# Patient Record
Sex: MALE | Race: WHITE | NOT HISPANIC OR LATINO | Employment: FULL TIME | URBAN - METROPOLITAN AREA
[De-identification: names, ages, dates, MRNs, and addresses within clinical notes are randomized per-mention and may not be internally consistent; named-entity substitution may affect disease eponyms.]

---

## 2017-01-31 ENCOUNTER — AMBULATORY - HEALTHEAST (OUTPATIENT)
Dept: LAB | Facility: CLINIC | Age: 37
End: 2017-01-31

## 2017-01-31 DIAGNOSIS — M06.9 RHEUMATOID ARTHRITIS (H): ICD-10-CM

## 2017-02-01 LAB
ALT SERPL W P-5'-P-CCNC: 22 U/L (ref 0–45)
CREAT SERPL-MCNC: 0.82 MG/DL (ref 0.7–1.3)
GFR SERPL CREATININE-BSD FRML MDRD: >60 ML/MIN/1.73M2

## 2017-02-10 ENCOUNTER — COMMUNICATION - HEALTHEAST (OUTPATIENT)
Dept: ADMINISTRATIVE | Facility: CLINIC | Age: 37
End: 2017-02-10

## 2017-02-10 DIAGNOSIS — M05.79 SEROPOSITIVE RHEUMATOID ARTHRITIS OF MULTIPLE SITES (H): ICD-10-CM

## 2017-03-29 ENCOUNTER — OFFICE VISIT - HEALTHEAST (OUTPATIENT)
Dept: RHEUMATOLOGY | Facility: CLINIC | Age: 37
End: 2017-03-29

## 2017-03-29 DIAGNOSIS — M25.461 KNEE EFFUSION, RIGHT: ICD-10-CM

## 2017-03-29 DIAGNOSIS — M05.79 SEROPOSITIVE RHEUMATOID ARTHRITIS OF MULTIPLE SITES (H): ICD-10-CM

## 2017-03-29 DIAGNOSIS — M06.9 RHEUMATOID ARTHRITIS (H): ICD-10-CM

## 2017-03-29 DIAGNOSIS — R76.8 CYCLIC CITRULLINATED PEPTIDE (CCP) ANTIBODY POSITIVE: ICD-10-CM

## 2017-03-29 DIAGNOSIS — M25.50 POLYARTHRALGIA: ICD-10-CM

## 2017-03-29 DIAGNOSIS — Z79.899 HIGH RISK MEDICATION USE: ICD-10-CM

## 2017-03-29 LAB
ALT SERPL W P-5'-P-CCNC: 21 U/L (ref 0–45)
CREAT SERPL-MCNC: 0.81 MG/DL (ref 0.7–1.3)
GFR SERPL CREATININE-BSD FRML MDRD: >60 ML/MIN/1.73M2

## 2017-03-29 ASSESSMENT — MIFFLIN-ST. JEOR: SCORE: 1558.54

## 2017-04-16 ENCOUNTER — COMMUNICATION - HEALTHEAST (OUTPATIENT)
Dept: RHEUMATOLOGY | Facility: CLINIC | Age: 37
End: 2017-04-16

## 2017-04-16 DIAGNOSIS — M05.79 SEROPOSITIVE RHEUMATOID ARTHRITIS OF MULTIPLE SITES (H): ICD-10-CM

## 2017-04-17 ENCOUNTER — COMMUNICATION - HEALTHEAST (OUTPATIENT)
Dept: ADMINISTRATIVE | Facility: CLINIC | Age: 37
End: 2017-04-17

## 2017-06-09 ENCOUNTER — COMMUNICATION - HEALTHEAST (OUTPATIENT)
Dept: RHEUMATOLOGY | Facility: CLINIC | Age: 37
End: 2017-06-09

## 2017-06-09 DIAGNOSIS — M05.79 SEROPOSITIVE RHEUMATOID ARTHRITIS OF MULTIPLE SITES (H): ICD-10-CM

## 2017-06-19 ENCOUNTER — COMMUNICATION - HEALTHEAST (OUTPATIENT)
Dept: RHEUMATOLOGY | Facility: CLINIC | Age: 37
End: 2017-06-19

## 2017-06-19 DIAGNOSIS — M05.79 SEROPOSITIVE RHEUMATOID ARTHRITIS OF MULTIPLE SITES (H): ICD-10-CM

## 2017-06-26 ENCOUNTER — AMBULATORY - HEALTHEAST (OUTPATIENT)
Dept: LAB | Facility: CLINIC | Age: 37
End: 2017-06-26

## 2017-06-26 DIAGNOSIS — M06.9 RHEUMATOID ARTHRITIS (H): ICD-10-CM

## 2017-06-26 LAB
ALT SERPL W P-5'-P-CCNC: 22 U/L (ref 0–45)
CREAT SERPL-MCNC: 0.87 MG/DL (ref 0.7–1.3)
GFR SERPL CREATININE-BSD FRML MDRD: >60 ML/MIN/1.73M2

## 2017-06-28 ENCOUNTER — OFFICE VISIT - HEALTHEAST (OUTPATIENT)
Dept: RHEUMATOLOGY | Facility: CLINIC | Age: 37
End: 2017-06-28

## 2017-06-28 ENCOUNTER — COMMUNICATION - HEALTHEAST (OUTPATIENT)
Dept: ADMINISTRATIVE | Facility: CLINIC | Age: 37
End: 2017-06-28

## 2017-06-28 DIAGNOSIS — R76.8 CYCLIC CITRULLINATED PEPTIDE (CCP) ANTIBODY POSITIVE: ICD-10-CM

## 2017-06-28 DIAGNOSIS — M05.79 SEROPOSITIVE RHEUMATOID ARTHRITIS OF MULTIPLE SITES (H): ICD-10-CM

## 2017-06-28 DIAGNOSIS — Z79.899 HIGH RISK MEDICATION USE: ICD-10-CM

## 2017-06-28 DIAGNOSIS — M71.21 POPLITEAL CYST, RIGHT: ICD-10-CM

## 2017-06-28 DIAGNOSIS — Z85.47 H/O TESTICULAR CANCER: ICD-10-CM

## 2017-06-28 ASSESSMENT — MIFFLIN-ST. JEOR: SCORE: 1558.54

## 2017-07-06 ENCOUNTER — COMMUNICATION - HEALTHEAST (OUTPATIENT)
Dept: RHEUMATOLOGY | Facility: CLINIC | Age: 37
End: 2017-07-06

## 2017-07-06 DIAGNOSIS — M05.79 SEROPOSITIVE RHEUMATOID ARTHRITIS OF MULTIPLE SITES (H): ICD-10-CM

## 2017-10-31 ENCOUNTER — COMMUNICATION - HEALTHEAST (OUTPATIENT)
Dept: ENDOCRINOLOGY | Facility: CLINIC | Age: 37
End: 2017-10-31

## 2017-10-31 DIAGNOSIS — M05.79 SEROPOSITIVE RHEUMATOID ARTHRITIS OF MULTIPLE SITES (H): ICD-10-CM

## 2017-11-01 ENCOUNTER — COMMUNICATION - HEALTHEAST (OUTPATIENT)
Dept: LAB | Facility: CLINIC | Age: 37
End: 2017-11-01

## 2017-11-01 DIAGNOSIS — M05.79 SEROPOSITIVE RHEUMATOID ARTHRITIS OF MULTIPLE SITES (H): ICD-10-CM

## 2017-11-02 ENCOUNTER — AMBULATORY - HEALTHEAST (OUTPATIENT)
Dept: LAB | Facility: CLINIC | Age: 37
End: 2017-11-02

## 2017-11-02 DIAGNOSIS — M05.79 SEROPOSITIVE RHEUMATOID ARTHRITIS OF MULTIPLE SITES (H): ICD-10-CM

## 2017-11-02 LAB
ALT SERPL W P-5'-P-CCNC: 16 U/L (ref 0–45)
CREAT SERPL-MCNC: 0.94 MG/DL (ref 0.7–1.3)
GFR SERPL CREATININE-BSD FRML MDRD: >60 ML/MIN/1.73M2

## 2018-01-18 ENCOUNTER — OFFICE VISIT - HEALTHEAST (OUTPATIENT)
Dept: RHEUMATOLOGY | Facility: CLINIC | Age: 38
End: 2018-01-18

## 2018-01-18 DIAGNOSIS — R76.8 CYCLIC CITRULLINATED PEPTIDE (CCP) ANTIBODY POSITIVE: ICD-10-CM

## 2018-01-18 DIAGNOSIS — Z79.899 HIGH RISK MEDICATION USE: ICD-10-CM

## 2018-01-18 DIAGNOSIS — M05.79 SEROPOSITIVE RHEUMATOID ARTHRITIS OF MULTIPLE SITES (H): ICD-10-CM

## 2018-01-18 ASSESSMENT — MIFFLIN-ST. JEOR: SCORE: 1560.36

## 2018-04-18 ENCOUNTER — COMMUNICATION - HEALTHEAST (OUTPATIENT)
Dept: ADMINISTRATIVE | Facility: CLINIC | Age: 38
End: 2018-04-18

## 2018-04-18 DIAGNOSIS — M05.79 SEROPOSITIVE RHEUMATOID ARTHRITIS OF MULTIPLE SITES (H): ICD-10-CM

## 2018-04-19 ENCOUNTER — AMBULATORY - HEALTHEAST (OUTPATIENT)
Dept: LAB | Facility: CLINIC | Age: 38
End: 2018-04-19

## 2018-04-19 DIAGNOSIS — M05.79 SEROPOSITIVE RHEUMATOID ARTHRITIS OF MULTIPLE SITES (H): ICD-10-CM

## 2018-04-19 LAB
ALBUMIN SERPL-MCNC: 3.7 G/DL (ref 3.5–5)
ALT SERPL W P-5'-P-CCNC: 17 U/L (ref 0–45)
CREAT SERPL-MCNC: 0.81 MG/DL (ref 0.7–1.3)
ERYTHROCYTE [DISTWIDTH] IN BLOOD BY AUTOMATED COUNT: 11.1 % (ref 11–14.5)
GFR SERPL CREATININE-BSD FRML MDRD: >60 ML/MIN/1.73M2
HCT VFR BLD AUTO: 49.2 % (ref 40–54)
HGB BLD-MCNC: 16.8 G/DL (ref 14–18)
MCH RBC QN AUTO: 31.5 PG (ref 27–34)
MCHC RBC AUTO-ENTMCNC: 34.1 G/DL (ref 32–36)
MCV RBC AUTO: 92 FL (ref 80–100)
PLATELET # BLD AUTO: 330 THOU/UL (ref 140–440)
PMV BLD AUTO: 7.3 FL (ref 7–10)
RBC # BLD AUTO: 5.33 MILL/UL (ref 4.4–6.2)
WBC: 7 THOU/UL (ref 4–11)

## 2018-04-25 ENCOUNTER — COMMUNICATION - HEALTHEAST (OUTPATIENT)
Dept: ENDOCRINOLOGY | Facility: CLINIC | Age: 38
End: 2018-04-25

## 2018-04-25 DIAGNOSIS — M05.79 SEROPOSITIVE RHEUMATOID ARTHRITIS OF MULTIPLE SITES (H): ICD-10-CM

## 2018-05-02 ENCOUNTER — COMMUNICATION - HEALTHEAST (OUTPATIENT)
Dept: ADMINISTRATIVE | Facility: CLINIC | Age: 38
End: 2018-05-02

## 2018-05-02 DIAGNOSIS — M05.79 SEROPOSITIVE RHEUMATOID ARTHRITIS OF MULTIPLE SITES (H): ICD-10-CM

## 2018-09-28 ENCOUNTER — COMMUNICATION - HEALTHEAST (OUTPATIENT)
Dept: ADMINISTRATIVE | Facility: CLINIC | Age: 38
End: 2018-09-28

## 2018-09-28 DIAGNOSIS — M05.79 SEROPOSITIVE RHEUMATOID ARTHRITIS OF MULTIPLE SITES (H): ICD-10-CM

## 2018-10-08 ENCOUNTER — AMBULATORY - HEALTHEAST (OUTPATIENT)
Dept: LAB | Facility: CLINIC | Age: 38
End: 2018-10-08

## 2018-10-08 DIAGNOSIS — M05.79 SEROPOSITIVE RHEUMATOID ARTHRITIS OF MULTIPLE SITES (H): ICD-10-CM

## 2018-10-08 LAB
ALBUMIN SERPL-MCNC: 3.9 G/DL (ref 3.5–5)
ALT SERPL W P-5'-P-CCNC: 18 U/L (ref 0–45)
CREAT SERPL-MCNC: 0.98 MG/DL (ref 0.7–1.3)
ERYTHROCYTE [DISTWIDTH] IN BLOOD BY AUTOMATED COUNT: 10.8 % (ref 11–14.5)
GFR SERPL CREATININE-BSD FRML MDRD: >60 ML/MIN/1.73M2
HCT VFR BLD AUTO: 48 % (ref 40–54)
HGB BLD-MCNC: 16.8 G/DL (ref 14–18)
MCH RBC QN AUTO: 31.9 PG (ref 27–34)
MCHC RBC AUTO-ENTMCNC: 35 G/DL (ref 32–36)
MCV RBC AUTO: 91 FL (ref 80–100)
PLATELET # BLD AUTO: 257 THOU/UL (ref 140–440)
PMV BLD AUTO: 7.8 FL (ref 7–10)
RBC # BLD AUTO: 5.26 MILL/UL (ref 4.4–6.2)
WBC: 6.1 THOU/UL (ref 4–11)

## 2018-10-30 ENCOUNTER — COMMUNICATION - HEALTHEAST (OUTPATIENT)
Dept: ADMINISTRATIVE | Facility: CLINIC | Age: 38
End: 2018-10-30

## 2018-10-30 DIAGNOSIS — M05.79 SEROPOSITIVE RHEUMATOID ARTHRITIS OF MULTIPLE SITES (H): ICD-10-CM

## 2018-11-14 ENCOUNTER — OFFICE VISIT - HEALTHEAST (OUTPATIENT)
Dept: RHEUMATOLOGY | Facility: CLINIC | Age: 38
End: 2018-11-14

## 2018-11-14 DIAGNOSIS — R76.8 CYCLIC CITRULLINATED PEPTIDE (CCP) ANTIBODY POSITIVE: ICD-10-CM

## 2018-11-14 DIAGNOSIS — Z79.899 HIGH RISK MEDICATION USE: ICD-10-CM

## 2018-11-14 DIAGNOSIS — M05.79 SEROPOSITIVE RHEUMATOID ARTHRITIS OF MULTIPLE SITES (H): ICD-10-CM

## 2019-04-08 ENCOUNTER — COMMUNICATION - HEALTHEAST (OUTPATIENT)
Dept: RHEUMATOLOGY | Facility: CLINIC | Age: 39
End: 2019-04-08

## 2019-04-17 ENCOUNTER — COMMUNICATION - HEALTHEAST (OUTPATIENT)
Dept: RHEUMATOLOGY | Facility: CLINIC | Age: 39
End: 2019-04-17

## 2019-04-17 ENCOUNTER — AMBULATORY - HEALTHEAST (OUTPATIENT)
Dept: RHEUMATOLOGY | Facility: CLINIC | Age: 39
End: 2019-04-17

## 2019-04-17 DIAGNOSIS — M05.79 SEROPOSITIVE RHEUMATOID ARTHRITIS OF MULTIPLE SITES (H): ICD-10-CM

## 2019-05-24 ENCOUNTER — COMMUNICATION - HEALTHEAST (OUTPATIENT)
Dept: ADMINISTRATIVE | Facility: CLINIC | Age: 39
End: 2019-05-24

## 2019-07-16 ENCOUNTER — COMMUNICATION - HEALTHEAST (OUTPATIENT)
Dept: RHEUMATOLOGY | Facility: CLINIC | Age: 39
End: 2019-07-16

## 2019-07-16 DIAGNOSIS — M05.79 SEROPOSITIVE RHEUMATOID ARTHRITIS OF MULTIPLE SITES (H): ICD-10-CM

## 2019-07-18 ENCOUNTER — OFFICE VISIT - HEALTHEAST (OUTPATIENT)
Dept: RHEUMATOLOGY | Facility: CLINIC | Age: 39
End: 2019-07-18

## 2019-07-18 DIAGNOSIS — R76.8 CYCLIC CITRULLINATED PEPTIDE (CCP) ANTIBODY POSITIVE: ICD-10-CM

## 2019-07-18 DIAGNOSIS — Z85.47 H/O TESTICULAR CANCER: ICD-10-CM

## 2019-07-18 DIAGNOSIS — M05.79 SEROPOSITIVE RHEUMATOID ARTHRITIS OF MULTIPLE SITES (H): ICD-10-CM

## 2019-07-18 DIAGNOSIS — Z79.899 HIGH RISK MEDICATION USE: ICD-10-CM

## 2019-07-18 LAB
ALBUMIN SERPL-MCNC: 3.7 G/DL (ref 3.5–5)
ALT SERPL W P-5'-P-CCNC: 17 U/L (ref 0–45)
C REACTIVE PROTEIN LHE: 0.5 MG/DL (ref 0–0.8)
CREAT SERPL-MCNC: 0.87 MG/DL (ref 0.7–1.3)
ERYTHROCYTE [DISTWIDTH] IN BLOOD BY AUTOMATED COUNT: 10.6 % (ref 11–14.5)
ERYTHROCYTE [SEDIMENTATION RATE] IN BLOOD BY WESTERGREN METHOD: 30 MM/HR (ref 0–15)
GFR SERPL CREATININE-BSD FRML MDRD: >60 ML/MIN/1.73M2
HCT VFR BLD AUTO: 47.6 % (ref 40–54)
HGB BLD-MCNC: 16.2 G/DL (ref 14–18)
MCH RBC QN AUTO: 30.8 PG (ref 27–34)
MCHC RBC AUTO-ENTMCNC: 34.1 G/DL (ref 32–36)
MCV RBC AUTO: 90 FL (ref 80–100)
PLATELET # BLD AUTO: 282 THOU/UL (ref 140–440)
PMV BLD AUTO: 7.6 FL (ref 7–10)
RBC # BLD AUTO: 5.28 MILL/UL (ref 4.4–6.2)
WBC: 6.1 THOU/UL (ref 4–11)

## 2019-08-02 ENCOUNTER — OFFICE VISIT - HEALTHEAST (OUTPATIENT)
Dept: RHEUMATOLOGY | Facility: CLINIC | Age: 39
End: 2019-08-02

## 2019-08-02 DIAGNOSIS — M05.79 SEROPOSITIVE RHEUMATOID ARTHRITIS OF MULTIPLE SITES (H): ICD-10-CM

## 2019-08-02 DIAGNOSIS — Z85.47 H/O TESTICULAR CANCER: ICD-10-CM

## 2019-08-02 DIAGNOSIS — R76.8 CYCLIC CITRULLINATED PEPTIDE (CCP) ANTIBODY POSITIVE: ICD-10-CM

## 2019-08-02 DIAGNOSIS — Z79.899 HIGH RISK MEDICATION USE: ICD-10-CM

## 2019-08-30 ENCOUNTER — AMBULATORY - HEALTHEAST (OUTPATIENT)
Dept: LAB | Facility: CLINIC | Age: 39
End: 2019-08-30

## 2019-08-30 DIAGNOSIS — M05.79 SEROPOSITIVE RHEUMATOID ARTHRITIS OF MULTIPLE SITES (H): ICD-10-CM

## 2019-08-30 LAB
ALBUMIN SERPL-MCNC: 4 G/DL (ref 3.5–5)
ALT SERPL W P-5'-P-CCNC: 23 U/L (ref 0–45)
CREAT SERPL-MCNC: 0.97 MG/DL (ref 0.7–1.3)
ERYTHROCYTE [DISTWIDTH] IN BLOOD BY AUTOMATED COUNT: 11.1 % (ref 11–14.5)
GFR SERPL CREATININE-BSD FRML MDRD: >60 ML/MIN/1.73M2
HCT VFR BLD AUTO: 50.2 % (ref 40–54)
HGB BLD-MCNC: 17 G/DL (ref 14–18)
MCH RBC QN AUTO: 30.9 PG (ref 27–34)
MCHC RBC AUTO-ENTMCNC: 33.8 G/DL (ref 32–36)
MCV RBC AUTO: 92 FL (ref 80–100)
PLATELET # BLD AUTO: 245 THOU/UL (ref 140–440)
PMV BLD AUTO: 7.8 FL (ref 7–10)
RBC # BLD AUTO: 5.48 MILL/UL (ref 4.4–6.2)
WBC: 6.4 THOU/UL (ref 4–11)

## 2019-10-21 ENCOUNTER — COMMUNICATION - HEALTHEAST (OUTPATIENT)
Dept: RHEUMATOLOGY | Facility: CLINIC | Age: 39
End: 2019-10-21

## 2019-10-21 DIAGNOSIS — M05.79 SEROPOSITIVE RHEUMATOID ARTHRITIS OF MULTIPLE SITES (H): ICD-10-CM

## 2019-10-24 ENCOUNTER — COMMUNICATION - HEALTHEAST (OUTPATIENT)
Dept: RHEUMATOLOGY | Facility: CLINIC | Age: 39
End: 2019-10-24

## 2019-10-25 ENCOUNTER — AMBULATORY - HEALTHEAST (OUTPATIENT)
Dept: LAB | Facility: CLINIC | Age: 39
End: 2019-10-25

## 2019-10-25 DIAGNOSIS — M05.79 SEROPOSITIVE RHEUMATOID ARTHRITIS OF MULTIPLE SITES (H): ICD-10-CM

## 2019-10-25 LAB
ALBUMIN SERPL-MCNC: 3.9 G/DL (ref 3.5–5)
ALT SERPL W P-5'-P-CCNC: 21 U/L (ref 0–45)
CREAT SERPL-MCNC: 0.89 MG/DL (ref 0.7–1.3)
ERYTHROCYTE [DISTWIDTH] IN BLOOD BY AUTOMATED COUNT: 10.9 % (ref 11–14.5)
GFR SERPL CREATININE-BSD FRML MDRD: >60 ML/MIN/1.73M2
HCT VFR BLD AUTO: 47.7 % (ref 40–54)
HGB BLD-MCNC: 16.5 G/DL (ref 14–18)
MCH RBC QN AUTO: 31.6 PG (ref 27–34)
MCHC RBC AUTO-ENTMCNC: 34.6 G/DL (ref 32–36)
MCV RBC AUTO: 91 FL (ref 80–100)
PLATELET # BLD AUTO: 242 THOU/UL (ref 140–440)
PMV BLD AUTO: 7.8 FL (ref 7–10)
RBC # BLD AUTO: 5.22 MILL/UL (ref 4.4–6.2)
WBC: 5.5 THOU/UL (ref 4–11)

## 2020-03-10 ENCOUNTER — COMMUNICATION - HEALTHEAST (OUTPATIENT)
Dept: RHEUMATOLOGY | Facility: CLINIC | Age: 40
End: 2020-03-10

## 2020-03-10 DIAGNOSIS — M05.79 SEROPOSITIVE RHEUMATOID ARTHRITIS OF MULTIPLE SITES (H): ICD-10-CM

## 2020-03-23 ENCOUNTER — COMMUNICATION - HEALTHEAST (OUTPATIENT)
Dept: LAB | Facility: CLINIC | Age: 40
End: 2020-03-23

## 2020-03-23 DIAGNOSIS — M05.79 SEROPOSITIVE RHEUMATOID ARTHRITIS OF MULTIPLE SITES (H): ICD-10-CM

## 2020-03-30 ENCOUNTER — COMMUNICATION - HEALTHEAST (OUTPATIENT)
Dept: RHEUMATOLOGY | Facility: CLINIC | Age: 40
End: 2020-03-30

## 2020-03-30 ENCOUNTER — OFFICE VISIT - HEALTHEAST (OUTPATIENT)
Dept: RHEUMATOLOGY | Facility: CLINIC | Age: 40
End: 2020-03-30

## 2020-03-30 DIAGNOSIS — Z79.899 HIGH RISK MEDICATION USE: ICD-10-CM

## 2020-03-30 DIAGNOSIS — M05.79 SEROPOSITIVE RHEUMATOID ARTHRITIS OF MULTIPLE SITES (H): ICD-10-CM

## 2020-03-30 DIAGNOSIS — R76.8 CYCLIC CITRULLINATED PEPTIDE (CCP) ANTIBODY POSITIVE: ICD-10-CM

## 2020-04-15 ENCOUNTER — COMMUNICATION - HEALTHEAST (OUTPATIENT)
Dept: ADMINISTRATIVE | Facility: CLINIC | Age: 40
End: 2020-04-15

## 2020-07-30 ENCOUNTER — COMMUNICATION - HEALTHEAST (OUTPATIENT)
Dept: RHEUMATOLOGY | Facility: CLINIC | Age: 40
End: 2020-07-30

## 2020-11-03 ENCOUNTER — COMMUNICATION - HEALTHEAST (OUTPATIENT)
Dept: RHEUMATOLOGY | Facility: CLINIC | Age: 40
End: 2020-11-03

## 2020-11-03 ENCOUNTER — AMBULATORY - HEALTHEAST (OUTPATIENT)
Dept: LAB | Facility: CLINIC | Age: 40
End: 2020-11-03

## 2020-11-03 DIAGNOSIS — M05.79 SEROPOSITIVE RHEUMATOID ARTHRITIS OF MULTIPLE SITES (H): ICD-10-CM

## 2020-11-03 DIAGNOSIS — Z79.899 HIGH RISK MEDICATION USE: ICD-10-CM

## 2020-11-03 LAB
ALBUMIN SERPL-MCNC: 3.8 G/DL (ref 3.5–5)
ALT SERPL W P-5'-P-CCNC: 19 U/L (ref 0–45)
CREAT SERPL-MCNC: 0.9 MG/DL (ref 0.7–1.3)
ERYTHROCYTE [DISTWIDTH] IN BLOOD BY AUTOMATED COUNT: 10.5 % (ref 11–14.5)
GFR SERPL CREATININE-BSD FRML MDRD: >60 ML/MIN/1.73M2
HCT VFR BLD AUTO: 45.7 % (ref 40–54)
HGB BLD-MCNC: 15.6 G/DL (ref 14–18)
MCH RBC QN AUTO: 31.1 PG (ref 27–34)
MCHC RBC AUTO-ENTMCNC: 34.1 G/DL (ref 32–36)
MCV RBC AUTO: 91 FL (ref 80–100)
PLATELET # BLD AUTO: 249 THOU/UL (ref 140–440)
PMV BLD AUTO: 8.8 FL (ref 7–10)
RBC # BLD AUTO: 5.01 MILL/UL (ref 4.4–6.2)
WBC: 4.4 THOU/UL (ref 4–11)

## 2020-11-06 ENCOUNTER — COMMUNICATION - HEALTHEAST (OUTPATIENT)
Dept: RHEUMATOLOGY | Facility: CLINIC | Age: 40
End: 2020-11-06

## 2020-12-07 ENCOUNTER — OFFICE VISIT - HEALTHEAST (OUTPATIENT)
Dept: RHEUMATOLOGY | Facility: CLINIC | Age: 40
End: 2020-12-07

## 2020-12-07 DIAGNOSIS — M70.21 OLECRANON BURSITIS OF BOTH ELBOWS: ICD-10-CM

## 2020-12-07 DIAGNOSIS — R76.8 CYCLIC CITRULLINATED PEPTIDE (CCP) ANTIBODY POSITIVE: ICD-10-CM

## 2020-12-07 DIAGNOSIS — Z85.47 H/O TESTICULAR CANCER: ICD-10-CM

## 2020-12-07 DIAGNOSIS — Z79.899 HIGH RISK MEDICATION USE: ICD-10-CM

## 2020-12-07 DIAGNOSIS — M05.79 SEROPOSITIVE RHEUMATOID ARTHRITIS OF MULTIPLE SITES (H): ICD-10-CM

## 2020-12-07 DIAGNOSIS — M70.22 OLECRANON BURSITIS OF BOTH ELBOWS: ICD-10-CM

## 2020-12-23 ENCOUNTER — OFFICE VISIT - HEALTHEAST (OUTPATIENT)
Dept: RHEUMATOLOGY | Facility: CLINIC | Age: 40
End: 2020-12-23

## 2020-12-23 DIAGNOSIS — M25.421 EFFUSION OF OLECRANON BURSA, RIGHT: ICD-10-CM

## 2020-12-23 DIAGNOSIS — M05.79 SEROPOSITIVE RHEUMATOID ARTHRITIS OF MULTIPLE SITES (H): ICD-10-CM

## 2021-03-25 ENCOUNTER — COMMUNICATION - HEALTHEAST (OUTPATIENT)
Dept: RHEUMATOLOGY | Facility: CLINIC | Age: 41
End: 2021-03-25

## 2021-03-25 DIAGNOSIS — M05.79 SEROPOSITIVE RHEUMATOID ARTHRITIS OF MULTIPLE SITES (H): ICD-10-CM

## 2021-05-27 NOTE — TELEPHONE ENCOUNTER
Request was thru patient's [employer] insurance.  That insurance termed 08/31/2018.  Patient now has [facility] insurance.  They have an active PA on file until 10/04/2019.  Called and verified with pharmacy and they stated they are not currently waiting on a PA and that patient has been able to fill is script without issue.

## 2021-05-30 VITALS — HEIGHT: 70 IN | WEIGHT: 141.6 LBS | BODY MASS INDEX: 20.27 KG/M2

## 2021-05-30 NOTE — PROGRESS NOTES
ASSESSMENT AND PLAN:  Robert Horton 38 y.o. male is a for follow-up after extended hiatus.  He has severe seropositive rheumatoid arthritis.  He has active synovitis in PIPs of both his hands.  This is despite Humira.  We had discussed how smoking can neutralize several of the treatment options and rheumatoid arthritis including tumor necrosis factor inhibitors.  Today's plan would be for him to reconsider these options.  Meanwhile prednisone 15 mg daily for the next 2 weeks and we will get together further course to be charted.  Today's labs as noted.         Diagnoses and all orders for this visit:    Seropositive rheumatoid arthritis of multiple sites (H)  -     predniSONE (DELTASONE) 10 mg tablet; Take 15 mg by mouth daily.  Dispense: 45 tablet; Refill: 0  -     C-Reactive Protein  -     Erythrocyte Sedimentation Rate    Cyclic citrullinated peptide (CCP) antibody positive  -     predniSONE (DELTASONE) 10 mg tablet; Take 15 mg by mouth daily.  Dispense: 45 tablet; Refill: 0    High risk medication use  -     ALT (SGPT)  -     Albumin  -     Creatinine  -     HM2(CBC w/o Differential)    H/O testicular cancer      HISTORY OF PRESENTING ILLNESS:  Robert Horton, 38 y.o., male is a  who has severe seropositive rheumatoid arthritis here for follow-up after extended hiatus.  He is on Humira.  He took it most recently 2 weeks ago, but the dose prior was missed because it leaked out, and had a 3-week break.  He is reporting increasing pain.  This is in his hands bilaterally.  Moderately severe.  He points to the PIPs of both hands.  He has in the past and not been inclined to take methotrexate in part because of his alcohol consumption and because of friends mom who had rheumatoid arthritis had passed away and the concern was it might have been secondary to methotrexate.  As noted before he does not think that the quitting smoking is right now an option for him..  Because of some insurance issues  "related with his and his wife, he was not able to get Humira for 2 months 2-1/2 months he started hurting.  He has been using ibuprofen.  This is mostly in the left wrist.  With the last couple of injections he is beginning to improve.  He noted his pain level down to 0.5/10 mild and now in the left wrist, able do all his day-to-day activities, no stiffness in the morning.  Does not want to take methotrexate.   .  He had testicular cancer 10 years ago and is been deemed cured.  He is a smoker for the past 15 years 20 cigarettes a day alcohol 12-18 drinks per week. Further historical information and ADL limitations as noted in the multidimensional health assessment questionnaire attached in the EMR.       Following is the excerpt from a recent note: .His arthropathy beganund summer of 2015.  In the initial phases he started hurting in his hands and knees and now he is hurting in those areas as well as the shoulders and wrists.  He was seen in his local clinic at fault was constant.  Evidently his labs showed that he had anti-CCP antibody of more than 250, above the labs in reference range.  He was seen by rheumatology and [facility].  He was asked to start taking methotrexate, Enbrel.  Prior to that he had been on prednisone which helped his symptoms most.  Currently he takes taking \"2000 mg\" of Tylenol or ibuprofen the same time.  He has noted that the pain is so severe that it takes him half an hour to put on one of his socks in the morning.  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  As he was billing his life up with his family he decided not to carry health insurance.  And this was in an effort to get at home for themselves.  He was successful in saving the money and buying the home.  He unfortunately developed his current symptoms with no insurance.  He reports pain level of severe nature, stiffness in the morning lasting 7-8 hours, there is hardly " anything that he can do without difficulty..  He has fatigue and weakness.  He has swelling on the knees especially the right side.  He feels that his knuckles are swollen.  He has difficult time sleeping at night because he is woken repeatedly due to the shoulder pain.  ALLERGIES:Gadolinium-containing contrast media and Oxygen    PAST MEDICAL/ACTIVE PROBLEMS/MEDICATION/ FAMILY HISTORY/SOCIAL DATA:  The patient has a family history of  No past medical history on file.  Social History     Tobacco Use   Smoking Status Current Every Day Smoker   Smokeless Tobacco Never Used   Tobacco Comment    20  ciggs/daily     Patient Active Problem List   Diagnosis     Seropositive rheumatoid arthritis of multiple sites (H)     Cyclic citrullinated peptide (CCP) antibody positive     High risk medication use     Popliteal cyst, right     H/O testicular cancer     Current Outpatient Medications   Medication Sig Dispense Refill     HUMIRA PEN 40 mg/0.8 mL PnKt Inject 0.8ML (40MG TOTAL) UNDER THE SKIN EVERY 14 DAYS 1 kit 2     ibuprofen (ADVIL,MOTRIN) 800 MG tablet Take 800 mg by mouth every 6 (six) hours as needed for pain.       folic acid (FOLVITE) 1 MG tablet Take 1 tablet (1 mg total) by mouth daily. 90 tablet 3     Current Facility-Administered Medications   Medication Dose Route Frequency Provider Last Rate Last Dose     triamcinolone acetonide 40 mg/mL injection 40 mg (KENALOG-40)  40 mg Intra-articular Once Mckay Maguire MBBS            DETAILED EXAMINATION  07/18/19  :  Vitals:    07/18/19 1245   BP: 108/58   Patient Site: Right Arm   Patient Position: Sitting   Cuff Size: Adult Regular   Pulse: 64   Weight: 143 lb (64.9 kg)     Alert oriented. Head including the face is examined for malar rash, heliotropes, scarring, lupus pernio. Eyes examined for redness such as in episcleritis/scleritis, periorbital lesions.   Neck/ Face examined for parotid gland swelling, range of motion of neck.  Left upper and lower and right upper  and lower extremities examined for tenderness, swelling, warmth of the appendicular joints, range of motion, edema, rash.  Some of the important findings included: He has synovitis of the right middle and ring finger PIP especially the ring, and of the left middle finger PIP joints.            LAB / IMAGING DATA:  ALT   Date Value Ref Range Status   10/08/2018 18 0 - 45 U/L Final   04/19/2018 17 0 - 45 U/L Final   11/02/2017 16 0 - 45 U/L Final       WBC   Date Value Ref Range Status   10/08/2018 6.1 4.0 - 11.0 thou/uL Final   04/19/2018 7.0 4.0 - 11.0 thou/uL Final   09/29/2015 5.7 4.0 - 11.0 thou/uL Final       No results found for: MISSY

## 2021-05-31 VITALS — HEIGHT: 70 IN | WEIGHT: 141.6 LBS | BODY MASS INDEX: 20.27 KG/M2

## 2021-05-31 VITALS — WEIGHT: 142 LBS | HEIGHT: 70 IN | BODY MASS INDEX: 20.33 KG/M2

## 2021-05-31 NOTE — PROGRESS NOTES
"ASSESSMENT AND PLAN:  Robert Horton 38 y.o. male is a for follow-up.  He has a severe seropositive very high titer of anti-CCP antibody more than 1200 rheumatoid arthritis, he has responded to Humira but my residual synovitis in his hands, as noted in the previous visit responded to corticosteroids, he drinks \"a lot of alcohol\" 5 days a week, hands reluctant to go for methotrexate, go for sulfasalazine major side effects outlined literature on this is provided.  Meanwhile taper prednisone as noted.  Will meet here in 3 months.  Labs every 4 weeks.            Diagnoses and all orders for this visit:    Seropositive rheumatoid arthritis of multiple sites (H)  -     sulfaSALAzine (AZULFIDINE) 500 mg tablet; Take 1 tablet (500 mg total) by mouth 2 (two) times a day for 15 days, THEN 2 tablets (1,000 mg total) 2 (two) times a day.  Dispense: 210 tablet; Refill: 0  -     predniSONE (DELTASONE) 2.5 MG tablet; 7.5 mg/d prednisone PO for 1 month, reduce to 5 mg/d for the next month, and then reduce to 2.5 mg/d for the third month and then stop..  Dispense: 90 tablet; Refill: 2    Cyclic citrullinated peptide (CCP) antibody positive    H/O testicular cancer    High risk medication use      HISTORY OF PRESENTING ILLNESS:  Robert Horton, 38 y.o., male is a  who has severe seropositive rheumatoid arthritis here for follow-up.  With the supplementary prednisone in addition to his back on Humira he feels 99% improved.  He noted pain level to be 0.5/10.  He has a tiny swelling on his left middle finger PIP area.  Other joint areas of being so much better.  His work is very stressful.  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  He would prefer not to.  He cannot have given his job description take other options such as marijuana.    Following is the excerpt from a recent note:     He " "is on Humira.  He took it most recently 2 weeks ago, but the dose prior was missed because it leaked out, and had a 3-week break.  He is reporting increasing pain.  This is in his hands bilaterally.  Moderately severe.  He points to the PIPs of both hands.  He has in the past and not been inclined to take methotrexate in part because of his alcohol consumption and because of friends mom who had rheumatoid arthritis had passed away and the concern was it might have been secondary to methotrexate.  As noted before he does not think that the quitting smoking is right now an option for him..  Because of some insurance issues related with his and his wife, he was not able to get Humira for 2 months 2-1/2 months he started hurting.  He has been using ibuprofen.  This is mostly in the left wrist.  With the last couple of injections he is beginning to improve.  He noted his pain level down to 0.5/10 mild and now in the left wrist, able do all his day-to-day activities, no stiffness in the morning.  Does not want to take methotrexate.   .  He had testicular cancer 10 years ago and is been deemed cured.  He is a smoker for the past 15 years 20 cigarettes a day alcohol 12-18 drinks per week. Further historical information and ADL limitations as noted in the multidimensional health assessment questionnaire attached in the EMR.       Following is the excerpt from a recent note: .His arthropathy beganund summer of 2015.  In the initial phases he started hurting in his hands and knees and now he is hurting in those areas as well as the shoulders and wrists.  He was seen in his local clinic at fault was constant.  Evidently his labs showed that he had anti-CCP antibody of more than 250, above the labs in reference range.  He was seen by rheumatology and [facility].  He was asked to start taking methotrexate, Enbrel.  Prior to that he had been on prednisone which helped his symptoms most.  Currently he takes taking \"2000 " "mg\" of Tylenol or ibuprofen the same time.  He has noted that the pain is so severe that it takes him half an hour to put on one of his socks in the morning.  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  As he was billing his life up with his family he decided not to carry health insurance.  And this was in an effort to get at home for themselves.  He was successful in saving the money and buying the home.  He unfortunately developed his current symptoms with no insurance.  He reports pain level of severe nature, stiffness in the morning lasting 7-8 hours, there is hardly anything that he can do without difficulty..  He has fatigue and weakness.  He has swelling on the knees especially the right side.  He feels that his knuckles are swollen.  He has difficult time sleeping at night because he is woken repeatedly due to the shoulder pain.  ALLERGIES:Gadolinium-containing contrast media and Oxygen    PAST MEDICAL/ACTIVE PROBLEMS/MEDICATION/ FAMILY HISTORY/SOCIAL DATA:  The patient has a family history of  No past medical history on file.  Social History     Tobacco Use   Smoking Status Current Every Day Smoker   Smokeless Tobacco Never Used   Tobacco Comment    20  ciggs/daily     Patient Active Problem List   Diagnosis     Seropositive rheumatoid arthritis of multiple sites (H)     Cyclic citrullinated peptide (CCP) antibody positive     High risk medication use     Popliteal cyst, right     H/O testicular cancer     Current Outpatient Medications   Medication Sig Dispense Refill     HUMIRA PEN 40 mg/0.8 mL PnKt Inject 0.8ML (40MG TOTAL) UNDER THE SKIN EVERY 14 DAYS 1 kit 2     ibuprofen (ADVIL,MOTRIN) 800 MG tablet Take 800 mg by mouth every 6 (six) hours as needed for pain.       predniSONE (DELTASONE) 10 mg tablet Take 15 mg by mouth daily. 45 tablet 0     folic acid (FOLVITE) 1 MG tablet Take 1 tablet (1 mg total) by mouth daily. 90 tablet 3     Current " Facility-Administered Medications   Medication Dose Route Frequency Provider Last Rate Last Dose     triamcinolone acetonide 40 mg/mL injection 40 mg (KENALOG-40)  40 mg Intra-articular Once Mckay Maguire MBBS            DETAILED EXAMINATION  08/02/19  :  Vitals:    08/02/19 1026   BP: 96/60   Patient Site: Right Arm   Patient Position: Sitting   Cuff Size: Adult Regular   Pulse: 66   Weight: 143 lb (64.9 kg)     Alert oriented. Head including the face is examined for malar rash, heliotropes, scarring, lupus pernio. Eyes examined for redness such as in episcleritis/scleritis, periorbital lesions.   Neck/ Face examined for parotid gland swelling, range of motion of neck.  Left upper and lower and right upper and lower extremities examined for tenderness, swelling, warmth of the appendicular joints, range of motion, edema, rash.  Some of the important findings included: There is no synovitis in any of his palpable upper extremity joints, knees, ankles, he has the residual left middle finger PIP tiny synovial cyst on the dorsal aspect for the ulnar side of the joint.               LAB / IMAGING DATA:  ALT   Date Value Ref Range Status   07/18/2019 17 0 - 45 U/L Final   10/08/2018 18 0 - 45 U/L Final   04/19/2018 17 0 - 45 U/L Final       WBC   Date Value Ref Range Status   07/18/2019 6.1 4.0 - 11.0 thou/uL Final   10/08/2018 6.1 4.0 - 11.0 thou/uL Final   09/29/2015 5.7 4.0 - 11.0 thou/uL Final       No results found for: MISSY

## 2021-06-02 VITALS — WEIGHT: 142 LBS | BODY MASS INDEX: 20.37 KG/M2

## 2021-06-02 NOTE — TELEPHONE ENCOUNTER
Patient is calling, he is waiting on the Humira prescription to get to his pharmacy. He is hoping we can get this Rx sent in asap.

## 2021-06-02 NOTE — TELEPHONE ENCOUNTER
Who is calling:  Patient     Reason for Call: Calling back for update on PA status. Patient is anxiously awaiting the approval of this medication . Please call and update .   Date of last appointment with     primary care: 08/02/19    Okay to leave a detailed message: Yes

## 2021-06-03 VITALS — WEIGHT: 143 LBS | BODY MASS INDEX: 20.52 KG/M2

## 2021-06-05 VITALS
SYSTOLIC BLOOD PRESSURE: 116 MMHG | BODY MASS INDEX: 21.52 KG/M2 | WEIGHT: 150 LBS | DIASTOLIC BLOOD PRESSURE: 80 MMHG | HEART RATE: 72 BPM

## 2021-06-07 NOTE — PROGRESS NOTES
"Robert Horton is a 39 y.o. male who is being evaluated via a billable telephone visit.      The patient has been notified of following:     \"This telephone visit will be conducted via a call between you and your physician/provider. We have found that certain health care needs can be provided without the need for a physical exam.  This service lets us provide the care you need with a short phone conversation.  If a prescription is necessary we can send it directly to your pharmacy.  If lab work is needed we can place an order for that and you can then stop by our lab to have the test done at a later time.    If during the course of the call the physician/provider feels a telephone visit is not appropriate, you will not be charged for this service.\"     Physician has received verbal consent for a Telephone Visit from the patient? Yes    Robert Horton complains of    Chief Complaint   Patient presents with     Follow-up       I have reviewed and updated the patient's Past Medical History, Social History, Family History and Medication List.    ALLERGIES  Gadolinium-containing contrast media and Oxygen    Additional provider notes:     Chely Newell CMA      ASSESSMENT AND PLAN:    Diagnoses and all orders for this visit:    Seropositive rheumatoid arthritis of multiple sites (H)  -     ALT (SGPT); Standing  -     Albumin; Standing  -     Creatinine; Standing  -     HM2(CBC w/o Differential); Standing  -     adalimumab 40 mg/0.8 mL PnKt; Inject 0.8 mLs (40 mg) subcutaneously every 14 days.  Dispense: 1 kit; Refill: 4    Cyclic citrullinated peptide (CCP) antibody positive    High risk medication use  -     ALT (SGPT); Standing  -     Albumin; Standing  -     Creatinine; Standing  -     HM2(CBC w/o Differential); Standing          HISTORY OF PRESENTING ILLNESS:  Robert Horton 39 y.o. is evaluated here via phone/tele health visit.  He has rheumatoid arthritis he is no longer taking self after he ran out he " "stopped taking it he takes \"too much alcohol\" to be able to take methotrexate, due for labs.  He works in construction site as a coordinator.  History of testicular cancer that bleomycin.  Today we also discussed the issues related to the current pandemic, the pros and cons of the current treatment plan, the CDC guidelines such as social distancing washing the hands covering the cough.  ALLERGIES:Gadolinium-containing contrast media and Oxygen    PAST MEDICAL/ACTIVE PROBLEMS/MEDICATION/SOCIAL DATA  No past medical history on file.  Social History     Tobacco Use   Smoking Status Current Every Day Smoker   Smokeless Tobacco Never Used   Tobacco Comment    20  ciggs/daily     Patient Active Problem List   Diagnosis     Seropositive rheumatoid arthritis of multiple sites (H)     Cyclic citrullinated peptide (CCP) antibody positive     High risk medication use     Popliteal cyst, right     H/O testicular cancer     Current Outpatient Medications   Medication Sig Dispense Refill     adalimumab 40 mg/0.8 mL PnKt Inject 0.8 mLs (40 mg) subcutaneously every 14 days. 1 kit 1     ibuprofen (ADVIL,MOTRIN) 800 MG tablet Take 800 mg by mouth every 6 (six) hours as needed for pain.       folic acid (FOLVITE) 1 MG tablet Take 1 tablet (1 mg total) by mouth daily. 90 tablet 3     predniSONE (DELTASONE) 2.5 MG tablet 7.5 mg/d prednisone PO for 1 month, reduce to 5 mg/d for the next month, and then reduce to 2.5 mg/d for the third month and then stop.. 90 tablet 2     sulfaSALAzine (AZULFIDINE) 500 mg tablet Take 1 tablet (500 mg total) by mouth 2 (two) times a day for 15 days, THEN 2 tablets (1,000 mg total) 2 (two) times a day. 210 tablet 0     Current Facility-Administered Medications   Medication Dose Route Frequency Provider Last Rate Last Dose     triamcinolone acetonide 40 mg/mL injection 40 mg (KENALOG-40)  40 mg Intra-articular Once Mckay Maguire MBBS             EXAMINATION: None, phone visit.      LAB / IMAGING DATA:  ALT "   Date Value Ref Range Status   10/25/2019 21 0 - 45 U/L Final   08/30/2019 23 0 - 45 U/L Final   07/18/2019 17 0 - 45 U/L Final     Albumin   Date Value Ref Range Status   10/25/2019 3.9 3.5 - 5.0 g/dL Final   08/30/2019 4.0 3.5 - 5.0 g/dL Final   07/18/2019 3.7 3.5 - 5.0 g/dL Final     Creatinine   Date Value Ref Range Status   10/25/2019 0.89 0.70 - 1.30 mg/dL Final   08/30/2019 0.97 0.70 - 1.30 mg/dL Final   07/18/2019 0.87 0.70 - 1.30 mg/dL Final       WBC   Date Value Ref Range Status   10/25/2019 5.5 4.0 - 11.0 thou/uL Final   08/30/2019 6.4 4.0 - 11.0 thou/uL Final   09/29/2015 5.7 4.0 - 11.0 thou/uL Final     Hemoglobin   Date Value Ref Range Status   10/25/2019 16.5 14.0 - 18.0 g/dL Final   08/30/2019 17.0 14.0 - 18.0 g/dL Final   07/18/2019 16.2 14.0 - 18.0 g/dL Final     Platelets   Date Value Ref Range Status   10/25/2019 242 140 - 440 thou/uL Final   08/30/2019 245 140 - 440 thou/uL Final   07/18/2019 282 140 - 440 thou/uL Final       Lab Results   Component Value Date    RF 30.9 (H) 01/19/2016    SEDRATE 30 (H) 07/18/2019     Duration of the call:6.06 Minutes

## 2021-06-07 NOTE — TELEPHONE ENCOUNTER
Dr Maguire    In regards of: adalimumab 40 mg/0.8 mL PnKt     costing him 500$, before he had signed up for some paperwork to qualify for it, but assuming it isnt up to date anymore.     pls call him back @ [phone]

## 2021-06-07 NOTE — TELEPHONE ENCOUNTER
Per Dr. Maguire, 3 months an OV f/up   Scheduled.     Chely Newell CMA [facility] 3/30/2020 3:12 PM

## 2021-06-07 NOTE — TELEPHONE ENCOUNTER
spoke with pharmacy staff to get a better idea of what is going on. Sounds like Humira copay card has been exhausted and they are working with their  on some save on program.

## 2021-06-09 NOTE — PROGRESS NOTES
ASSESSMENT AND PLAN:  Robert Horton 36 y.o. male seems to have turned the corner finally for his severe high titer rheumatoid arthritis, anti-CCP more than 1200, he took about 3 months before the Humira did become effective.  He is going to stop hydroxychloroquine he is to continue methotrexate and Humira.  We talked about injecting the right knee.  He also describes painful shoulders which suggestive of tendinopathy.  We will revisit this on his next visit.  He is aware that there are early signs of damage to his knee cartilage already.  Continue labs as now.        Diagnoses and all orders for this visit:    Seropositive rheumatoid arthritis of multiple sites    Knee effusion, right    High risk medication use    Cyclic citrullinated peptide (CCP) antibody positive    Polyarthralgia    HISTORY OF PRESENTING ILLNESS:  Robert Horton, 36 y.o., male is a  who has severe seropositive rheumatoid arthritis here for follow-up of severe rheumatoid arthritis.  He seems to finally have done very well with the addition of Humira took him almost 3 months improved.  He has noted fullness in the popliteal area.  There is not much of pain.  He has discomfort in the shoulder areas especially at night when he rolls over on one side or the other.  This can occasionally wake him up.  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  He feels that his hands are improved so much almost back to normal.  He noted pain level of 3.0/10.  His shoulders can be stiff for 3 hours in the morning.  His knuckles are no longer swollen. There is no family history of rheumatoid arthritis, lupus, psoriasis, ulcerative colitis, Crohn's disease.  He had testicular cancer 10 years ago and is been deemed cured.  He is a smoker for the past 15 years 20 cigarettes a day alcohol 12-18 drinks per week. Further historical information and ADL limitations as noted in the  "multidimensional health assessment questionnaire attached in the EMR.       Following is the excerpt from a recent note: .His arthropathy beganund summer of 2015.  In the initial phases he started hurting in his hands and knees and now he is hurting in those areas as well as the shoulders and wrists.  He was seen in his local clinic at fault was constant.  Evidently his labs showed that he had anti-CCP antibody of more than 250, above the labs in reference range.  He was seen by rheumatology and [facility].  He was asked to start taking methotrexate, Enbrel.  Prior to that he had been on prednisone which helped his symptoms most.  Currently he takes taking \"2000 mg\" of Tylenol or ibuprofen the same time.  He has noted that the pain is so severe that it takes him half an hour to put on one of his socks in the morning.  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  As he was billing his life up with his family he decided not to carry health insurance.  And this was in an effort to get at home for themselves.  He was successful in saving the money and buying the home.  He unfortunately developed his current symptoms with no insurance.  He reports pain level of severe nature, stiffness in the morning lasting 7-8 hours, there is hardly anything that he can do without difficulty..  He has fatigue and weakness.  He has swelling on the knees especially the right side.  He feels that his knuckles are swollen.  He has difficult time sleeping at night because he is woken repeatedly due to the shoulder pain.  ALLERGIES:Gadolinium-containing contrast media and Oxygen    PAST MEDICAL/ACTIVE PROBLEMS/MEDICATION/ FAMILY HISTORY/SOCIAL DATA:  The patient has a family history of  No past medical history on file.  History   Smoking Status     Current Every Day Smoker   Smokeless Tobacco     Not on file     Comment: 20  ciggs/daily     Patient Active Problem List   Diagnosis     " "Knee effusion, right     Polyarthralgia     Seropositive rheumatoid arthritis of multiple sites     Cyclic citrullinated peptide (CCP) antibody positive     High risk medication use     Current Outpatient Prescriptions   Medication Sig Dispense Refill     acetaminophen (TYLENOL) 325 MG tablet Take 650 mg by mouth every 6 (six) hours as needed for pain.       adalimumab (HUMIRA) 40 mg/0.8 mL injection Inject 40 mg under the skin every 14 (fourteen) days.       HUMIRA PEN 40 mg/0.8 mL PnKt   10     hydroxychloroquine (PLAQUENIL) 200 mg tablet Take 1 tablet (200 mg total) by mouth 2 (two) times a day. 60 tablet 3     ibuprofen (ADVIL,MOTRIN) 800 MG tablet Take 800 mg by mouth every 6 (six) hours as needed for pain.       methotrexate 2.5 MG tablet TAKE TEN TABLETS BY MOUTH EVERY WEEK 40 tablet 1     predniSONE (DELTASONE) 2.5 MG tablet Take 7.5 mg/d prednisone PO for 1 month, reduce to 5 mg/d for the next month, and then reduce to 2.5 mg/d for the third month and then stop. 90 tablet 2     Current Facility-Administered Medications   Medication Dose Route Frequency Provider Last Rate Last Dose     triamcinolone acetonide 40 mg/mL injection 40 mg (KENALOG-40)  40 mg Intra-articular Once JOSEF Caldwell            DETAILED EXAMINATION (six area) :  Vitals:    03/29/17 1103   Weight: 141 lb 9.6 oz (64.2 kg)   Height: 5' 10\" (1.778 m)     Alert oriented. Head including the face is examined for malar rash, heliotropes, scarring, lupus pernio. Eyes examined for redness such as in episcleritis/scleritis, periorbital lesions.   Neck examined  for lymph nodes, range of motion Both upper and lower extremities (all four) examined for swollen, warm &/or  tender joints, range of motion, rash, muscle weakness, edema. The salient normal / abnormal findings are appended.    In obvious pain alert oriented male. Vital data as noted above.  He has persistent swelling in the popliteal area on the right knee with an effusion.  However he " does not have any significant residual synovitis anymore.  He appears very more comfortable.      LAB / IMAGING DATA:  ALT   Date Value Ref Range Status   01/31/2017 22 0 - 45 U/L Final   11/11/2016 19 0 - 45 U/L Final   08/09/2016 18 0 - 45 U/L Final       WBC   Date Value Ref Range Status   01/31/2017 7.5 4.0 - 11.0 thou/uL Final   11/11/2016 10.6 4.0 - 11.0 thou/uL Final   09/29/2015 5.7 4.0 - 11.0 thou/uL Final       No results found for: MISSY

## 2021-06-10 NOTE — TELEPHONE ENCOUNTER
LVMTCB    Because of the COVID-19 restrictions, we're not seeing pt in clinic at this time.   1st call was made to confirm appt today- no answer. Will try again later    Chely Newell CMA [facility] 7/30/2020 10:57 AM

## 2021-06-10 NOTE — TELEPHONE ENCOUNTER
LVMTCB  2nd attempt,  no answer, unable to confirm appt.  appt today w Dr. Maguire is canceled.      Chely Newell CMA [facility] 7/30/2020 11:58 AM

## 2021-06-11 NOTE — PROGRESS NOTES
ASSESSMENT AND PLAN:  Robert Horton 36 y.o. male severe seropositive rheumatoid arthritis is finally doing great with a combination of Humira and methotrexate.  He has residual popliteal cyst on the right side.  His marital situation has taken an adverse turn and they may be parting ways, he is insured through her.  We reviewed the data labs so far.  I have asked him to now stress the labs to every 3 months.  We will meet here at 12 weeks.        Diagnoses and all orders for this visit:    Seropositive rheumatoid arthritis of multiple sites  -     Discontinue: methotrexate 2.5 MG tablet; TAKE 10 TABLETS BY MOUTH ONCE WEEKLY AS DIRECTED  Dispense: 40 tablet; Refill: 1  -     methotrexate 2.5 MG tablet; TAKE 10 TABLETS BY MOUTH ONCE WEEKLY AS DIRECTED  Dispense: 120 tablet; Refill: 0  -     folic acid (FOLVITE) 1 MG tablet; Take 1 tablet (1 mg total) by mouth daily.  Dispense: 90 tablet; Refill: 3    Cyclic citrullinated peptide (CCP) antibody positive    High risk medication use  -     folic acid (FOLVITE) 1 MG tablet; Take 1 tablet (1 mg total) by mouth daily.  Dispense: 90 tablet; Refill: 3    Popliteal cyst, right    H/O testicular cancer      HISTORY OF PRESENTING ILLNESS:  Robert Horton, 36 y.o., male is a  who has severe seropositive rheumatoid arthritis here for follow-up of severe rheumatoid arthritis.  He seems to finally have done very well with the addition of Humira.  He is very happy with the progress.  He is virtually pain-free now.  He still has some discomfort in his right knee when he ambulates for long distance with a feeling of pressure popliteal area.  His concern is now that his wife and he are possibly putting the airways and she is stopping working and he is concerned that he might lose insurance which is through her.  He is going to look into this and other options.   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  He feels that his hands are improved so much almost back to normal.  He noted pain level of 3.0/10.  His shoulders can be stiff for 3 hours in the morning.  His knuckles are no longer swollen. There is no family history of rheumatoid arthritis, lupus, psoriasis, ulcerative colitis, Crohn's disease.  He had testicular cancer 10 years ago and is been deemed cured.  He is a smoker for the past 15 years 20 cigarettes a day alcohol 12-18 drinks per week. Further historical information and ADL limitations as noted in the multidimensional health assessment questionnaire attached in the EMR.       Following is the excerpt from a recent note: .His arthropathy beganund summer of 2015.  In the initial phases he started hurting in his hands and knees and now he is hurting in those areas as well as the shoulders and wrists.  He was seen in his local clinic at fault was constant.  Evidently his labs showed that he had anti-CCP antibody of more than 250, above the labs in reference range.  He was seen by rheumatology and [facility].  He was asked to start taking methotrexate, Enbrel.  Prior to that he had been on prednisone which helped his symptoms most.  Currently he takes taking \"2000 mg\" of Tylenol or ibuprofen the same time.  He has noted that the pain is so severe that it takes him half an hour to put on one of his socks in the morning.  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  As he was billing his life up with his family he decided not to carry health insurance.  And this was in an effort to get at home for themselves.  He was successful in saving the money and buying the home.  He unfortunately developed his current symptoms with no insurance.  He reports pain level of severe nature, stiffness in the morning lasting 7-8 hours, there is hardly anything that he can do without difficulty..  He has fatigue and weakness.  He has swelling on the knees " "especially the right side.  He feels that his knuckles are swollen.  He has difficult time sleeping at night because he is woken repeatedly due to the shoulder pain.  ALLERGIES:Gadolinium-containing contrast media and Oxygen    PAST MEDICAL/ACTIVE PROBLEMS/MEDICATION/ FAMILY HISTORY/SOCIAL DATA:  The patient has a family history of  No past medical history on file.  History   Smoking Status     Current Every Day Smoker   Smokeless Tobacco     Not on file     Comment: 20  ciggs/daily     Patient Active Problem List   Diagnosis     Seropositive rheumatoid arthritis of multiple sites     Cyclic citrullinated peptide (CCP) antibody positive     High risk medication use     Popliteal cyst, right     Current Outpatient Prescriptions   Medication Sig Dispense Refill     acetaminophen (TYLENOL) 325 MG tablet Take 650 mg by mouth every 6 (six) hours as needed for pain.       adalimumab (HUMIRA) 40 mg/0.8 mL injection Inject 0.8 mL (40 mg total) under the skin every 14 (fourteen) days. 2 each 11     HUMIRA PEN 40 mg/0.8 mL PnKt   10     ibuprofen (ADVIL,MOTRIN) 800 MG tablet Take 800 mg by mouth every 6 (six) hours as needed for pain.       methotrexate 2.5 MG tablet TAKE 10 TABLETS BY MOUTH ONCE WEEKLY AS DIRECTED 40 tablet 1     Current Facility-Administered Medications   Medication Dose Route Frequency Provider Last Rate Last Dose     triamcinolone acetonide 40 mg/mL injection 40 mg (KENALOG-40)  40 mg Intra-articular Once JOSEF Caldwell            DETAILED EXAMINATION (six area) :  Vitals:    06/28/17 1020   BP: 106/56   Patient Site: Left Arm   Patient Position: Sitting   Cuff Size: Adult Regular   Pulse: 80   Weight: 141 lb 9.6 oz (64.2 kg)   Height: 5' 10\" (1.778 m)     Alert oriented. Head including the face is examined for malar rash, heliotropes, scarring, lupus pernio. Eyes examined for redness such as in episcleritis/scleritis, periorbital lesions.   Neck examined  for lymph nodes, range of motion Both upper and " lower extremities (all four) examined for swollen, warm &/or  tender joints, range of motion, rash, muscle weakness, edema. The salient normal / abnormal findings are appended.  Vital data as noted above.  He has persistent swelling in the popliteal area on the right knee. However he does not have any residual synovitis anymore.  He appears very more comfortable.      LAB / IMAGING DATA:  ALT   Date Value Ref Range Status   06/26/2017 22 0 - 45 U/L Final   03/29/2017 21 0 - 45 U/L Final   01/31/2017 22 0 - 45 U/L Final       WBC   Date Value Ref Range Status   06/26/2017 6.6 4.0 - 11.0 thou/uL Final   03/29/2017 7.7 4.0 - 11.0 thou/uL Final   09/29/2015 5.7 4.0 - 11.0 thou/uL Final       No results found for: MISSY

## 2021-06-13 NOTE — PROGRESS NOTES
"Robert Horton is a 40 y.o. male who is being evaluated via a billable video visit.      The patient has been notified of following:     \"This video visit will be conducted via a call between you and your physician/provider. We have found that certain health care needs can be provided without the need for an in-person physical exam.  This service lets us provide the care you need with a video conversation.  If a prescription is necessary we can send it directly to your pharmacy.  If lab work is needed we can place an order for that and you can then stop by our lab to have the test done at a later time.    Video visits are billed at different rates depending on your insurance coverage. Please reach out to your insurance provider with any questions.    If during the course of the call the physician/provider feels a video visit is not appropriate, you will not be charged for this service.\"    Patient has given verbal consent to a Video visit? Yes  How would you like to obtain your AVS? AVS Preference: MyChart.  If dropped by the video visit, the video invitation should be sent to: Text to cell phone: [phone]  Will anyone else be joining your video visit? No          Video-Visit Details    Type of service:  Video Visit    Originating Location (pt. Location): Home    Distant Location (provider location):  [facility]     Platform used for Video Visit: [employer]    This document was created using a software with less than 100% fidelity, at times resulting in unintended, even erroneous syntax and grammar.  The reader is advised to keep this under consideration while reviewing, interpreting this note.    ASSESSMENT AND PLAN:    Diagnoses and all orders for this visit:    Seropositive rheumatoid arthritis of multiple sites (H)  -     adalimumab 40 mg/0.8 mL PnKt; Inject 40 mg under the skin every 14 (fourteen) days.  Dispense: 1 kit; Refill: 1    Cyclic citrullinated peptide (CCP) antibody " "positive    High risk medication use    H/O testicular cancer    Olecranon bursitis of both elbows          HISTORY OF PRESENTING ILLNESS:  Robert Horton 40 y.o. is evaluated here via video link.  This is for follow-up.  He has rheumatoid arthritis.  This is seropositive with high titers of anti-CCP antibody, rheumatoid factor, on Humira, well controlled as per his pain and stiffness is concerned however he has noted swelling, this is bilateral, around the olecranon area, worse on the right side, they look \"unsightly\", he therefore tried to drain them poking \"needles into it\" apart from getting some blood he did not obtain any other \"fluid\".  He had similar symptoms he recalls around his knees at one point.  He works in construction as a .  He has history of testicular cancer and had been given bleomycin.  He would like to have the olecranon swelling drained.  We will get together in the near future to further evaluate this.   ROS enquiry held for fever, ocular symptoms, rash, headache,  GI issues.  Today we also discussed the issues related to the current pandemic, the pros and cons of the current treatment plan, the CDC guidelines such as social distancing washing the hands covering the cough.  ALLERGIES:Gadolinium-containing contrast media and Oxygen    PAST MEDICAL/ACTIVE PROBLEMS/MEDICATION/SOCIAL DATA  History reviewed. No pertinent past medical history.  Social History     Tobacco Use   Smoking Status Current Every Day Smoker   Smokeless Tobacco Never Used   Tobacco Comment    20  ciggs/daily     Patient Active Problem List   Diagnosis     Seropositive rheumatoid arthritis of multiple sites (H)     Cyclic citrullinated peptide (CCP) antibody positive     High risk medication use     Popliteal cyst, right     H/O testicular cancer     Current Outpatient Medications   Medication Sig Dispense Refill     adalimumab 40 mg/0.8 mL PnKt Inject 0.8 mLs (40 mg) subcutaneously every 14 days. 1 kit 1 "     ibuprofen (ADVIL,MOTRIN) 800 MG tablet Take 800 mg by mouth every 6 (six) hours as needed for pain.       Current Facility-Administered Medications   Medication Dose Route Frequency Provider Last Rate Last Admin     triamcinolone acetonide 40 mg/mL injection 40 mg (KENALOG-40)  40 mg Intra-articular Once Mckay Maguire MBBS             EXAMINATION:    Using the audio and video link as best as possible the constitutional, neck, neurologic, psych, skin, both upper extremities areas/organ system were evaluated during this assessment.  Some of the important findings: Alert, oriented, speech fluent.    Able to fully flex the digits, into fists bilaterally, wrist and elbow range of motion appear normal, abduction of the shoulder is normal.  He has however bilateral olecranon bursa worse on the right side these do not appear to be inflamed.      LAB / IMAGING DATA:  ALT   Date Value Ref Range Status   11/03/2020 19 0 - 45 U/L Final   10/25/2019 21 0 - 45 U/L Final   08/30/2019 23 0 - 45 U/L Final     Albumin   Date Value Ref Range Status   11/03/2020 3.8 3.5 - 5.0 g/dL Final   10/25/2019 3.9 3.5 - 5.0 g/dL Final   08/30/2019 4.0 3.5 - 5.0 g/dL Final     Creatinine   Date Value Ref Range Status   11/03/2020 0.90 0.70 - 1.30 mg/dL Final   10/25/2019 0.89 0.70 - 1.30 mg/dL Final   08/30/2019 0.97 0.70 - 1.30 mg/dL Final       WBC   Date Value Ref Range Status   11/03/2020 4.4 4.0 - 11.0 thou/uL Final   10/25/2019 5.5 4.0 - 11.0 thou/uL Final   09/29/2015 5.7 4.0 - 11.0 thou/uL Final     Hemoglobin   Date Value Ref Range Status   11/03/2020 15.6 14.0 - 18.0 g/dL Final   10/25/2019 16.5 14.0 - 18.0 g/dL Final   08/30/2019 17.0 14.0 - 18.0 g/dL Final     Platelets   Date Value Ref Range Status   11/03/2020 249 140 - 440 thou/uL Final   10/25/2019 242 140 - 440 thou/uL Final   08/30/2019 245 140 - 440 thou/uL Final       Lab Results   Component Value Date    RF 30.9 (H) 01/19/2016    SEDRATE 30 (H) 07/18/2019     Duration of  the call:7  Minutes  Call start: 851am  Call end:  858 am

## 2021-06-14 NOTE — PROGRESS NOTES
"   This document was created using a software with less than 100% fidelity, at times resulting in unintended, even erroneous syntax and grammar.  The reader is advised to keep this under consideration while reviewing, interpreting this note.      ASSESSMENT AND PLAN:  Robert Horton 40 y.o. male is seen here on 12/23/20 for evaluation of swelling along the olecranon areas bilaterally worse on the right where he has effusion which is noninflamed appearing in the bursae of olecranon.  We discussed the options.  He had wondered if this is a dangerous thing he try to \"\" remove\" fluid using his needles.  I have asked him to not do that.  We discussed the options and indications for aspiration of olecranon bursa fluid, at this time it would appear that there is no indication that requires of aspiration.  He is comfortable with this plan.  I have asked him to keep his underlying cycle of follow-up for RA.  Diagnoses and all orders for this visit:    Effusion of olecranon bursa, right    Seropositive rheumatoid arthritis of multiple sites (H)          HISTORY OF PRESENTING ILLNESS ON 12/23/20 :  Robert Horton 40 y.o. is here for a evaluation of swelling in the elbow area.   Joints affected include Swelling of the olecranon area worse on the right side.. This has gone on for several months.  This is pain-free.  There is no limitation of range of motion.  He read up online.  It was suggested that he might lose function of the elbow.  He tried to aspirate the bursa twice unsuccessfully.  Over-the-counter treatment to date has been without significant relief.    Further historical information, including ROS and limitation in activities as noted in the multidimensional health assessment questionnaire scanned in the EMR and in the assessment and plan section.    ALLERGIES:Gadolinium-containing contrast media and Oxygen    PAST MEDICAL/ACTIVE PROBLEMS/MEDICATION/SOCIAL DATA  No past medical history on file.  Social History "     Tobacco Use   Smoking Status Current Every Day Smoker   Smokeless Tobacco Never Used   Tobacco Comment    20  ciggs/daily     Patient Active Problem List   Diagnosis     Seropositive rheumatoid arthritis of multiple sites (H)     Cyclic citrullinated peptide (CCP) antibody positive     High risk medication use     Popliteal cyst, right     H/O testicular cancer     Current Outpatient Medications   Medication Sig Dispense Refill     adalimumab 40 mg/0.8 mL PnKt Inject 40 mg under the skin every 14 (fourteen) days. 1 kit 1     ibuprofen (ADVIL,MOTRIN) 800 MG tablet Take 800 mg by mouth every 6 (six) hours as needed for pain.       Current Facility-Administered Medications   Medication Dose Route Frequency Provider Last Rate Last Admin     triamcinolone acetonide 40 mg/mL injection 40 mg (KENALOG-40)  40 mg Intra-articular Once Mckay Maguire MBBS             DETAILED EXAMINATION  12/23/20  :  Vitals:    12/23/20 1214   BP: 116/80   Patient Site: Right Arm   Patient Position: Sitting   Cuff Size: Adult Regular   Pulse: 72   Weight: 150 lb (68 kg)     Alert oriented. Head including the face is examined for malar rash, heliotropes, scarring, lupus pernio. Eyes examined for redness such as in episcleritis/scleritis, periorbital lesions.   Neck/ Face examined for parotid gland swelling, range of motion of neck.  Left upper and lower and right upper and lower extremities examined for tenderness, swelling, warmth of the appendicular joints, range of motion, edema, rash.  Some of the important findings included: He has olecranon bursa effusion on the right side, minimally so on the left, no features of acute inflammatory changes.  He has 2 marks on the right bursa where he had put in the needles.  Examined the right olecranon bursa with ultrasound, he has small quantity effusion without Doppler effect, in keeping with the clinical findings of absence of acute inflammatory changes..           LAB / IMAGING DATA:  ALT   Date  Value Ref Range Status   11/03/2020 19 0 - 45 U/L Final   10/25/2019 21 0 - 45 U/L Final   08/30/2019 23 0 - 45 U/L Final     Albumin   Date Value Ref Range Status   11/03/2020 3.8 3.5 - 5.0 g/dL Final   10/25/2019 3.9 3.5 - 5.0 g/dL Final   08/30/2019 4.0 3.5 - 5.0 g/dL Final     Creatinine   Date Value Ref Range Status   11/03/2020 0.90 0.70 - 1.30 mg/dL Final   10/25/2019 0.89 0.70 - 1.30 mg/dL Final   08/30/2019 0.97 0.70 - 1.30 mg/dL Final       WBC   Date Value Ref Range Status   11/03/2020 4.4 4.0 - 11.0 thou/uL Final   10/25/2019 5.5 4.0 - 11.0 thou/uL Final   09/29/2015 5.7 4.0 - 11.0 thou/uL Final     Hemoglobin   Date Value Ref Range Status   11/03/2020 15.6 14.0 - 18.0 g/dL Final   10/25/2019 16.5 14.0 - 18.0 g/dL Final   08/30/2019 17.0 14.0 - 18.0 g/dL Final     Platelets   Date Value Ref Range Status   11/03/2020 249 140 - 440 thou/uL Final   10/25/2019 242 140 - 440 thou/uL Final   08/30/2019 245 140 - 440 thou/uL Final       Lab Results   Component Value Date    RF 30.9 (H) 01/19/2016    SEDRATE 30 (H) 07/18/2019

## 2021-06-15 NOTE — PROGRESS NOTES
ASSESSMENT AND PLAN:  Robert Horton 37 y.o. male is a for follow-up after extended hiatus.  He has uncontrolled severe exacerbation of rheumatoid arthritis seropositive high titer anti-CCP more than 1200.  Unfortunately he has not been able to get insurance.  As part of the reason for his not following up.  He is no longer on Humira is for the same reason.  He has acute information including his wrists bilaterally.  Given his unique circumstances he is going to have to go back on prednisone.  I will ask him to take 7-1/2 mg daily which is the minimum dose that he has demonstrated required to let him continue to work.  Continue methotrexate and folic acid.  Check labs in 2 months as he is currently not on methotrexate again because of the tooth abscess.          Diagnoses and all orders for this visit:    Seropositive rheumatoid arthritis of multiple sites  -     methotrexate 2.5 MG tablet; TAKE 10 TABLETS BY MOUTH ONCE WEEKLY AS DIRECTED  Dispense: 120 tablet; Refill: 0  -     predniSONE (DELTASONE) 2.5 MG tablet; Take 7.5mg by mouth daily  Dispense: 90 tablet; Refill: 2  -     folic acid (FOLVITE) 1 MG tablet; Take 1 tablet (1 mg total) by mouth daily.  Dispense: 90 tablet; Refill: 3    High risk medication use  -     folic acid (FOLVITE) 1 MG tablet; Take 1 tablet (1 mg total) by mouth daily.  Dispense: 90 tablet; Refill: 3    Cyclic citrullinated peptide (CCP) antibody positive      HISTORY OF PRESENTING ILLNESS:  Robert Horton, 37 y.o., male is a  who has severe seropositive rheumatoid arthritis here for follow-up of severe rheumatoid arthritis.  While he had done so much better when he last seen here in June then thereafter he lost insurance could not take Humira.  He had been taking methotrexate.  But had to stop taking it because of a dental abscess.  All in all currently he is hurting and he is hurting in his hands wrists knees.  This is reminiscent of what he was like in fall 2015 when he  "first came in with severe pain.  He rates the pain at 4.0/10.  He found that when he was on prednisone 7-1/2 this information/pain was tolerable.  He had testicular cancer 10 years ago and is been deemed cured.  He is a smoker for the past 15 years 20 cigarettes a day alcohol 12-18 drinks per week. Further historical information and ADL limitations as noted in the multidimensional health assessment questionnaire attached in the EMR.       Following is the excerpt from a recent note: .His arthropathy beganund summer of 2015.  In the initial phases he started hurting in his hands and knees and now he is hurting in those areas as well as the shoulders and wrists.  He was seen in his local clinic at fault was constant.  Evidently his labs showed that he had anti-CCP antibody of more than 250, above the labs in reference range.  He was seen by rheumatology and [facility].  He was asked to start taking methotrexate, Enbrel.  Prior to that he had been on prednisone which helped his symptoms most.  Currently he takes taking \"2000 mg\" of Tylenol or ibuprofen the same time.  He has noted that the pain is so severe that it takes him half an hour to put on one of his socks in the morning.  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  As he was billing his life up with his family he decided not to carry health insurance.  And this was in an effort to get at home for themselves.  He was successful in saving the money and buying the home.  He unfortunately developed his current symptoms with no insurance.  He reports pain level of severe nature, stiffness in the morning lasting 7-8 hours, there is hardly anything that he can do without difficulty..  He has fatigue and weakness.  He has swelling on the knees especially the right side.  He feels that his knuckles are swollen.  He has difficult time sleeping at night because he is woken repeatedly due to the shoulder pain.  " "ALLERGIES:Gadolinium-containing contrast media and Oxygen    PAST MEDICAL/ACTIVE PROBLEMS/MEDICATION/ FAMILY HISTORY/SOCIAL DATA:  The patient has a family history of  No past medical history on file.  History   Smoking Status     Current Every Day Smoker   Smokeless Tobacco     Never Used     Comment: 20  ciggs/daily     Patient Active Problem List   Diagnosis     Seropositive rheumatoid arthritis of multiple sites     Cyclic citrullinated peptide (CCP) antibody positive     High risk medication use     Popliteal cyst, right     H/O testicular cancer     Current Outpatient Prescriptions   Medication Sig Dispense Refill     acetaminophen (TYLENOL) 325 MG tablet Take 650 mg by mouth every 6 (six) hours as needed for pain.       ibuprofen (ADVIL,MOTRIN) 800 MG tablet Take 800 mg by mouth every 6 (six) hours as needed for pain.       methotrexate 2.5 MG tablet TAKE 10 TABLETS BY MOUTH ONCE WEEKLY AS DIRECTED 120 tablet 0     predniSONE (DELTASONE) 2.5 MG tablet Take 7.5mg by mouth daily 90 tablet 2     adalimumab (HUMIRA) 40 mg/0.8 mL injection Inject 0.8 mL (40 mg total) under the skin every 14 (fourteen) days. 6 each 3     folic acid (FOLVITE) 1 MG tablet Take 1 tablet (1 mg total) by mouth daily. 90 tablet 3     Current Facility-Administered Medications   Medication Dose Route Frequency Provider Last Rate Last Dose     triamcinolone acetonide 40 mg/mL injection 40 mg (KENALOG-40)  40 mg Intra-articular Once JOSEF Caldwell            DETAILED EXAMINATION (six area) :  Vitals:    01/18/18 1301   BP: 120/72   Pulse: 92   Weight: 142 lb (64.4 kg)   Height: 5' 10\" (1.778 m)     Alert oriented. Head including the face is examined for malar rash, heliotropes, scarring, lupus pernio. Eyes examined for redness such as in episcleritis/scleritis, periorbital lesions.   Neck examined  for lymph nodes, range of motion Both upper and lower extremities (all four) examined for swollen, warm &/or  tender joints, range of motion, " rash, muscle weakness, edema. The salient normal / abnormal findings are appended.  He has acute inflammation in his multiple joints including wrists several MCPs and PIPs.        LAB / IMAGING DATA:  ALT   Date Value Ref Range Status   11/02/2017 16 0 - 45 U/L Final   06/26/2017 22 0 - 45 U/L Final   03/29/2017 21 0 - 45 U/L Final       WBC   Date Value Ref Range Status   11/02/2017 6.2 4.0 - 11.0 thou/uL Final   06/26/2017 6.6 4.0 - 11.0 thou/uL Final   09/29/2015 5.7 4.0 - 11.0 thou/uL Final       No results found for: MISSY

## 2021-06-16 ENCOUNTER — COMMUNICATION - HEALTHEAST (OUTPATIENT)
Dept: RHEUMATOLOGY | Facility: CLINIC | Age: 41
End: 2021-06-16

## 2021-06-16 ENCOUNTER — RECORDS - HEALTHEAST (OUTPATIENT)
Dept: ADMINISTRATIVE | Facility: CLINIC | Age: 41
End: 2021-06-16

## 2021-06-16 DIAGNOSIS — M05.79 SEROPOSITIVE RHEUMATOID ARTHRITIS OF MULTIPLE SITES (H): ICD-10-CM

## 2021-06-16 PROBLEM — Z85.47 H/O TESTICULAR CANCER: Status: ACTIVE | Noted: 2017-06-28

## 2021-06-16 PROBLEM — M25.421: Chronic | Status: ACTIVE | Noted: 2020-12-23

## 2021-06-16 PROBLEM — M71.21 POPLITEAL CYST, RIGHT: Status: ACTIVE | Noted: 2017-06-28

## 2021-06-16 NOTE — TELEPHONE ENCOUNTER
Telephone Encounter by Minerva Landeros at 7/18/2019  2:25 PM     Author: Minerva Landeros Service: -- Author Type: Financial Resource Guide    Filed: 7/18/2019  2:27 PM Encounter Date: 7/16/2019 Status: Signed    : Minerva Landeros (Financial Resource Guide)       Medication: Humira 40mg/0.8ml   Qty: 2 pens for 28 days  Insurance: PAID/[employer]  Test Claim: Not covered at this location  Pharmacy: [facility]  Additional Information: NA

## 2021-06-16 NOTE — TELEPHONE ENCOUNTER
Telephone Encounter by Minerva Landeros at 3/10/2020  2:26 PM     Author: Minerva Landeros Service: -- Author Type: Financial Resource Guide    Filed: 3/10/2020  2:28 PM Encounter Date: 3/10/2020 Status: Signed    : Minerva Landeros (Financial Resource Guide)       Medication: Humira 40mg/0.8ml  Qty: 2 pens for 28 days  Insurance: [employer]s  Test Claim: not covered at this location  Pharmacy: [facility] in [place] per pt's request  Additional Information: NA

## 2021-06-16 NOTE — TELEPHONE ENCOUNTER
Telephone Encounter by Minerva Landeros at 10/24/2019  9:19 AM     Author: Minerva Landeros Service: -- Author Type: Financial Resource Guide    Filed: 10/24/2019  9:22 AM Encounter Date: 10/24/2019 Status: Signed    : Minerva Landeros (Financial Resource Guide)       Medication: Humira 40mg/0.4ml  QTY: 2 pens for 28 days  Insurance: [employer]s ([employer])   Additional Information: CMM wasn't able to retrieve clinical questions for Migel had to initiated the PA over the phone and was told it would take 2 business days to process

## 2021-06-16 NOTE — TELEPHONE ENCOUNTER
Telephone Encounter by Minerva Landeros at 3/30/2020  1:03 PM     Author: Minerva Landeros Service: -- Author Type: Financial Resource Guide    Filed: 3/30/2020  1:07 PM Encounter Date: 3/30/2020 Status: Signed    : Minerva Landeros (Financial Resource Guide)       Medication: Humira 40mg/0.88ml  Qty: 2 pens for 28 days  Insurance: [employer]s  Test Claim: not appropriate at this location  Pharmacy: [facility] in [place] per pt's request  Additional Information: PA expires 10/29/2020

## 2021-06-16 NOTE — TELEPHONE ENCOUNTER
Telephone Encounter by Minerva Landeros at 10/30/2019 10:14 AM     Author: Minerva Landeros Service: -- Author Type: Financial Resource Guide    Filed: 10/30/2019 10:21 AM Encounter Date: 10/24/2019 Status: Signed    : Minerva Landeros (Financial Resource Guide)       Medication: Humira 40mg/0.8ml  Qty: 2 pens for 28 days  Effective Dates: 09/30/2019 - 10/29/2020  Pharmacy: [facility] per pt's request  Copay Amount: Unknown  Copay Assistance: on file at pharmacy  Patient Notified? Yes

## 2021-06-17 RX ORDER — PREDNISONE 2.5 MG/1
7.5 TABLET ORAL DAILY
Qty: 90 TABLET | Refills: 0 | Status: SHIPPED | OUTPATIENT
Start: 2021-06-17 | End: 2021-07-15

## 2021-06-17 NOTE — TELEPHONE ENCOUNTER
Telephone Encounter by Minerva Landeros at 11/6/2020 12:28 PM     Author: Minerva Landeros Service: -- Author Type: Financial Resource Guide    Filed: 11/9/2020  9:01 AM Encounter Date: 11/6/2020 Status: Addendum    : Minerva Landeros (Financial Resource Guide)    Related Notes: Original Note by Minerva Landeros (Financial Resource Guide) filed at 11/6/2020 12:33 PM       Prior Authorization Approval  Medication: Humira 40mg/0.8ml  Qty: 2 pens for 28 days  Effective Dates: 10/07/2020 - 11/06/2021  Reference Number: [id number]  Insurance Company: [employer]  Pharmacy: [facility] in [place] per plan's restrictions  Expected Copay: unknown - not covered at this location  Copay Card Available: enrolled  Foundation Assistance Needed/Available: not needed  Patient Assistance Program Needed: not needed  Patient Notified? Yes  Pharmacy Notified? Yes

## 2021-06-21 NOTE — PROGRESS NOTES
ASSESSMENT AND PLAN:  Robert Horton 38 y.o. male is a for follow-up after extended hiatus.  He is back on Humira.  He has synovitis in his left wrist but then he was off it for several weeks because of insurance related issues.  He would rather not take methotrexate.  He is not on prednisone.  He does not think he can quit smoking.  Will meet here in 6 months or sooner.        Diagnoses and all orders for this visit:    Seropositive rheumatoid arthritis of multiple sites (H)    Cyclic citrullinated peptide (CCP) antibody positive    High risk medication use      HISTORY OF PRESENTING ILLNESS:  Robert Horton, 38 y.o., male is a  who has severe seropositive rheumatoid arthritis here for follow-up.  Because of some insurance issues related with his and his wife, he was not able to get Humira for 2 months 2-1/2 months he started hurting.  He has been using ibuprofen.  This is mostly in the left wrist.  With the last couple of injections he is beginning to improve.  He noted his pain level down to 0.5/10 mild and now in the left wrist, able do all his day-to-day activities, no stiffness in the morning.  Does not want to take methotrexate.   .  He had testicular cancer 10 years ago and is been deemed cured.  He is a smoker for the past 15 years 20 cigarettes a day alcohol 12-18 drinks per week. Further historical information and ADL limitations as noted in the multidimensional health assessment questionnaire attached in the EMR.       Following is the excerpt from a recent note: .His arthropathy beganund summer of 2015.  In the initial phases he started hurting in his hands and knees and now he is hurting in those areas as well as the shoulders and wrists.  He was seen in his local clinic at fault was constant.  Evidently his labs showed that he had anti-CCP antibody of more than 250, above the labs in reference range.  He was seen by rheumatology and [facility].  He was asked to start taking  "methotrexate, Enbrel.  Prior to that he had been on prednisone which helped his symptoms most.  Currently he takes taking \"2000 mg\" of Tylenol or ibuprofen the same time.  He has noted that the pain is so severe that it takes him half an hour to put on one of his socks in the morning.  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  As he was billing his life up with his family he decided not to carry health insurance.  And this was in an effort to get at home for themselves.  He was successful in saving the money and buying the home.  He unfortunately developed his current symptoms with no insurance.  He reports pain level of severe nature, stiffness in the morning lasting 7-8 hours, there is hardly anything that he can do without difficulty..  He has fatigue and weakness.  He has swelling on the knees especially the right side.  He feels that his knuckles are swollen.  He has difficult time sleeping at night because he is woken repeatedly due to the shoulder pain.  ALLERGIES:Gadolinium-containing contrast media and Oxygen    PAST MEDICAL/ACTIVE PROBLEMS/MEDICATION/ FAMILY HISTORY/SOCIAL DATA:  The patient has a family history of  No past medical history on file.  Social History     Tobacco Use   Smoking Status Current Every Day Smoker   Smokeless Tobacco Never Used   Tobacco Comment    20  ciggs/daily     Patient Active Problem List   Diagnosis     Seropositive rheumatoid arthritis of multiple sites (H)     Cyclic citrullinated peptide (CCP) antibody positive     High risk medication use     Popliteal cyst, right     H/O testicular cancer     Current Outpatient Medications   Medication Sig Dispense Refill     acetaminophen (TYLENOL) 325 MG tablet Take 650 mg by mouth every 6 (six) hours as needed for pain.       adalimumab (HUMIRA) 40 mg/0.8 mL injection Inject 0.8 mL (40 mg total) under the skin every 14 (fourteen) days. 1.6 mL 5     ibuprofen (ADVIL,MOTRIN) 800 MG tablet " Take 800 mg by mouth every 6 (six) hours as needed for pain.       folic acid (FOLVITE) 1 MG tablet Take 1 tablet (1 mg total) by mouth daily. 90 tablet 3     methotrexate 2.5 MG tablet TAKE 10 TABLETS BY MOUTH ONCE WEEKLY AS DIRECTED 40 tablet 0     Current Facility-Administered Medications   Medication Dose Route Frequency Provider Last Rate Last Dose     triamcinolone acetonide 40 mg/mL injection 40 mg (KENALOG-40)  40 mg Intra-articular Once JOSEF Caldwell            DETAILED EXAMINATION  11/14/18  :  Vitals:    11/14/18 1619   BP: 104/60   Patient Site: Right Arm   Patient Position: Sitting   Cuff Size: Adult Regular   Pulse: 80   Weight: 142 lb (64.4 kg)     Alert oriented. Head including the face is examined for malar rash, heliotropes, scarring, lupus pernio. Eyes examined for redness such as in episcleritis/scleritis, periorbital lesions.   Neck/ Face examined for parotid gland swelling, range of motion of neck.  Left upper and lower and right upper and lower extremities examined for tenderness, swelling, warmth of the appendicular joints, range of motion, edema, rash.  Some of the important findings included: He has synovitis of the left wrist.  No other areas of joint upper extremities, knees without effusion warmth or JLT.          LAB / IMAGING DATA:  ALT   Date Value Ref Range Status   10/08/2018 18 0 - 45 U/L Final   04/19/2018 17 0 - 45 U/L Final   11/02/2017 16 0 - 45 U/L Final       WBC   Date Value Ref Range Status   10/08/2018 6.1 4.0 - 11.0 thou/uL Final   04/19/2018 7.0 4.0 - 11.0 thou/uL Final   09/29/2015 5.7 4.0 - 11.0 thou/uL Final       No results found for: MISSY

## 2021-06-25 NOTE — TELEPHONE ENCOUNTER
Spoke with pt again and informed him I was able to verify he still has $14,165 on his copay card and that we do not need to apply for the free drug program. He is wondering if he can't get prednisone because he has been without humira and things are hurting.

## 2021-06-25 NOTE — TELEPHONE ENCOUNTER
Called [employer] (the company/[facility] for the copay card) and I was informed that patient has $14,165 remaining on the copay card.

## 2021-06-25 NOTE — TELEPHONE ENCOUNTER
Spoke with pt and informed him that PA approved with [employer] insurance. Explained the [facility] is not able to fill nor is his current [facility] pharmacy. I told him that [facility] pharmacy gave me the billing information for the copay card that I will pass onto [facility]. Robert then informed me that [facility] pharmacy told him that it was out of funds. I told him I would verify that but in the mean time we can start to get the ball rolling with the free drug program. He will send me his proof of income but explained it doesn't reflect that he got  and pays child support. So his real income is $72,000/year and pays $800/month in child support.

## 2021-06-25 NOTE — TELEPHONE ENCOUNTER
Called [facility] to see if they could ran to see if they are still able to fill and they are not able to. I asked if he had a copay card on file and he did and Savannah was able to provide me with the billing information on the copay card.     BIN: [id number]  DAVIDN:[id number]  ID: [id number]  Grp: [id number]

## 2021-06-25 NOTE — TELEPHONE ENCOUNTER
Who is calling:  Pt  Reason for Call:  Pt states he his insurance changed from [employer] to [employer]; Pt's Humira medications may not be covered and will have to pay more out of pocket.  Pt is asking if Gillian would reach back out to him to discuss if there is a medication financial assistance program that would help him?      Also Pt is wondering, right now he goes to a [facility] and Pt would like to change to  Specialty Pharmacy, if possible? He is wondering if this is something he needs to call and transfer his medications to  Specialty pharmacy or is this something Dr. Maguire's care team can help him with?  Pt is not even sure if  pharmacy is in his network?    Pt would like a call back to discuss this.    Okay to leave a detailed message: Yes

## 2021-06-26 NOTE — TELEPHONE ENCOUNTER
Pt states he is having more joint pain in his hands, feet, shoulders, and it will be a couple weeks before getting his next Humira due to insurance coverage..     Per Dr Maguire- pt can take prednisone 7.5mg daily for 30 days, Dr Maguire wonders why pt stopped methotrexate in the past and if he would consider going back on it.     Per Robert- he stopped methotrexate as his friends mom  from taking this and because he likes to drink alcohol and is not willing to give that up. Pt feels very strongly about not taking methotrexate. Pt states he has not had prednisone since Dr Maguire last prescribed this for him in 2019 and states he usually does fine on just Humira. Pt has f/u appt on  with Dr Maguire and will discuss this more with him then too. prednisone rx sent to [facility].

## 2021-07-04 NOTE — ADDENDUM NOTE
Addendum Note by Gillian Boyd RN at 6/17/2021  4:51 PM     Author: Gillian Boyd RN Service: -- Author Type: Registered Nurse    Filed: 6/17/2021  4:51 PM Encounter Date: 6/16/2021 Status: Signed    : Gillian Boyd RN (Registered Nurse)    Addended by: GILLIAN BOYD on: 6/17/2021 04:51 PM        Modules accepted: Orders

## 2021-07-04 NOTE — TELEPHONE ENCOUNTER
Telephone Encounter by Minerva Landeros at 6/16/2021 12:11 PM     Author: Minerva Landeros Service: -- Author Type: Financial Resource Guide    Filed: 6/16/2021 12:29 PM Encounter Date: 6/16/2021 Status: Signed    : Minerva Landeros (Financial Resource Guide)       Prior Authorization Approval  Medication: Humira 40mg/0.8ml  Qty: 2 pens for 28 days  Effective Dates: 5/17/21 - 6/16/22  Reference Number: [id number]  Insurance Company: [employer] ("[employer]  Pharmacy: [facility]  Expected Copay: not covered at this location  Copay Card Available: Yes, already enrolled  Foundation Assistance Needed/Available: no  Patient Assistance Program Needed: no  Patient Notified? Yes  Pharmacy Notified? Yes

## 2021-07-07 NOTE — TELEPHONE ENCOUNTER
Spoke with Mignon at [facility] and gave copay card billing information, despite having provided that on the rx I sent over to them.

## 2021-07-12 ENCOUNTER — TELEPHONE (OUTPATIENT)
Dept: RHEUMATOLOGY | Facility: CLINIC | Age: 41
End: 2021-07-12

## 2021-07-12 ENCOUNTER — VIRTUAL VISIT (OUTPATIENT)
Dept: RHEUMATOLOGY | Facility: CLINIC | Age: 41
End: 2021-07-12
Payer: COMMERCIAL

## 2021-07-12 DIAGNOSIS — M70.21 OLECRANON BURSITIS OF BOTH ELBOWS: ICD-10-CM

## 2021-07-12 DIAGNOSIS — Z79.899 HIGH RISK MEDICATION USE: ICD-10-CM

## 2021-07-12 DIAGNOSIS — Z85.47 H/O TESTICULAR CANCER: ICD-10-CM

## 2021-07-12 DIAGNOSIS — R76.8 CYCLIC CITRULLINATED PEPTIDE (CCP) ANTIBODY POSITIVE: ICD-10-CM

## 2021-07-12 DIAGNOSIS — M70.22 OLECRANON BURSITIS OF BOTH ELBOWS: ICD-10-CM

## 2021-07-12 DIAGNOSIS — M05.79 SEROPOSITIVE RHEUMATOID ARTHRITIS OF MULTIPLE SITES (H): Primary | ICD-10-CM

## 2021-07-12 DIAGNOSIS — M05.79 SEROPOSITIVE RHEUMATOID ARTHRITIS OF MULTIPLE SITES (H): ICD-10-CM

## 2021-07-12 PROCEDURE — 99214 OFFICE O/P EST MOD 30 MIN: CPT | Mod: 95 | Performed by: INTERNAL MEDICINE

## 2021-07-12 NOTE — PROGRESS NOTES
Robert is a 40 year old who is being evaluated via a billable video visit.      How would you like to obtain your AVS? MyChart  If the video visit is dropped, the invitation should be resent by: [phone]  Will anyone else be joining your video visit? No      Video Start Time: 4:41 PM  Video-Visit Details    Type of service:  Video Visit    Video End Time:4:49 PM    Originating Location (pt. Location): Home    [facility]     Platform used for Video Visit: Yammer     This document was created using a software with less than 100% fidelity, at times resulting in unintended, even erroneous syntax and grammar.  The reader is advised to keep this under consideration while reviewing, interpreting this note.           ASSESSMENT AND PLAN:    Diagnoses and all orders for this visit:  Seropositive rheumatoid arthritis of multiple sites (H)  -     adalimumab (HUMIRA) 40 MG/0.8ML pen kit; [ADALIMUMAB 40 MG/0.8 ML PNKT] Inject 0.8 mLs (40 mg) subcutaneously every 14 days.  Cyclic citrullinated peptide (CCP) antibody positive  High risk medication use  H/O testicular cancer  Olecranon bursitis of both elbows      Follow up in 3 months with labs.      HISTORY OF PRESENTING ILLNESS:  Robert Horton 40 year old is evaluated here via video/audio link. here for follow-up.  He has rheumatoid arthritis, polyarticular, seropositive, longstanding, he has done well with Humira.  He and his wife , his insurance was through her work.  There has been several weeks that he has not been able to get the Humira as his coverage is not as good and he would have had to pay $5000.  He is working with different insurance.  Meanwhile he is on 7.5 mg of prednisone and is virtually asymptomatic.  We discussed the side effects of steroids including ocular metabolic bone related.  He will start Humira as soon as possible we will meet here in 3 months labs prior       ROS  enquiry held for fever, ocular symptoms, rash, headache,  GI issues.  Today we also discussed the issues related to the current pandemic, the pros and cons of the current treatment plan, the CDC guidelines such as social distancing washing the hands covering the cough.  ALLERGIES:Gadolinium-containing contrast media [gadolinium derivatives] and Oxygen    PAST MEDICAL/ACTIVE PROBLEMS/MEDICATION/SOCIAL DATA  No past medical history on file.  History   Smoking Status     Current Every Day Smoker   Smokeless Tobacco     Never Used     Comment: 20  ciggs/daily     Patient Active Problem List   Diagnosis     Seropositive rheumatoid arthritis of multiple sites (H)     Cyclic citrullinated peptide (CCP) antibody positive     High risk medication use     Popliteal cyst, right     H/O testicular cancer     Effusion of olecranon bursa, right     Current Outpatient Medications   Medication Sig Dispense Refill     adalimumab 40 mg/0.8 mL PnKt [ADALIMUMAB 40 MG/0.8 ML PNKT] Inject 0.8 mLs (40 mg) subcutaneously every 14 days. 1 kit 2     adalimumab 40 mg/0.8 mL PnKt [ADALIMUMAB 40 MG/0.8 ML PNKT] Inject 0.8 mLs (40 mg) subcutaneously every 14 days. 1 kit 2     ibuprofen (ADVIL,MOTRIN) 800 MG tablet [IBUPROFEN (ADVIL,MOTRIN) 800 MG TABLET] Take 800 mg by mouth every 6 (six) hours as needed for pain.       predniSONE (DELTASONE) 2.5 MG tablet [PREDNISONE (DELTASONE) 2.5 MG TABLET] Take 7.5 mg by mouth daily. 90 tablet 0         EXAMINATION:    Using the audio and video link as best as possible the constitutional, neck, neurologic, psych, skin, both upper extremities areas/organ system were evaluated during this assessment.  Some of the important findings: Alert, oriented, speech fluent.    Able to fully flex the digits, into fists bilaterally, wrist and elbow range of motion appear normal.  LAB / IMAGING DATA:  ALT   Date Value Ref Range Status   11/03/2020 19 0 - 45 U/L Final   10/25/2019 21 0 - 45 U/L Final   08/30/2019 23 0 - 45  U/L Final     Albumin   Date Value Ref Range Status   11/03/2020 3.8 3.5 - 5.0 g/dL Final   10/25/2019 3.9 3.5 - 5.0 g/dL Final   08/30/2019 4.0 3.5 - 5.0 g/dL Final       WBC   Date Value Ref Range Status   11/03/2020 4.4 4.0 - 11.0 thou/uL Final   10/25/2019 5.5 4.0 - 11.0 thou/uL Final     Hemoglobin   Date Value Ref Range Status   11/03/2020 15.6 14.0 - 18.0 g/dL Final   10/25/2019 16.5 14.0 - 18.0 g/dL Final   08/30/2019 17.0 14.0 - 18.0 g/dL Final     Platelet Count   Date Value Ref Range Status   11/03/2020 249 140 - 440 thou/uL Final   10/25/2019 242 140 - 440 thou/uL Final   08/30/2019 245 140 - 440 thou/uL Final       No results found for: MISSY

## 2021-07-13 NOTE — TELEPHONE ENCOUNTER
Spoke with pt and he informed me that [facility] is telling him that that copay card is no longer active.

## 2021-07-13 NOTE — ORAL ONC MGMT
Attempted to call [facility] copay card to see what was going on with pt's copay card but I was on hold for over 30mins and had to hang up.

## 2021-07-13 NOTE — TELEPHONE ENCOUNTER
Called Robert back and informed him that my field  is going to have his copay card linked/merged with his new Humira Complete account and that the hub will be calling him to get this resolved so to be on the look out and answer [phone] calls today. Told him they also had his birth date as 1980 so that could have very well played a part in the issue. Told him I would Ak Chin back around with him tomorrow to make sure things are squared away.

## 2021-07-13 NOTE — TELEPHONE ENCOUNTER
Spoke with rhonda [facility]vino reimbursement specialty and she will have the hub merge his copay card with Humira Complete and then the hub will reach out to the Robert to get this resolved. Also found out that they had his birth date incorrect as 1980.

## 2021-07-14 NOTE — TELEPHONE ENCOUNTER
Elaine called me back and confirmed that copay card is active with money on it and that [facility] must be getting the alert for them to call to get the debit card processing info from [employer] and are either not realizing it or just not wanting to do it.

## 2021-07-14 NOTE — TELEPHONE ENCOUNTER
Called [facility] and spoke with Robin gave her the information below and then she transferred me to [facility]. Who informed me that copay is a little over $5,000/month and so pt will exhausted copay card funds after 2.5 months. Where then we will need to call rhonda to see if he can get additional funds added or he may have to apply for the Patient Assistance Program.

## 2021-07-14 NOTE — TELEPHONE ENCOUNTER
Called [facility] and they would not allow me to set up delivery for the patient but they put me on hold while they reached out to the patient to set up order with him so I could make sure it got done. The order got scheduled for this Friday 7/16/21.

## 2021-07-14 NOTE — TELEPHONE ENCOUNTER
Informed pt pt note below. He verbalized understanding. He was told [employer] would call him back today but I told him I wouldn't hold my breath for that to happen and suggested he give them a call in efforts to get this medication before next week. Then I told pt that I would call to set up delivery. He told me he is home after 6pm from work during the week. Pt also wonders if he can get another rx for a lower dose of prednisone because he is worried that he will be hurting by the time the humira kicks in because he has been without Humira for may be a month. I told him I would pass that message along to Dr. Maguire and call him back again after I set up order with [facility].

## 2021-07-15 RX ORDER — PREDNISONE 2.5 MG/1
5 TABLET ORAL DAILY
Qty: 60 TABLET | Refills: 0 | Status: SHIPPED | OUTPATIENT
Start: 2021-07-15 | End: 2022-04-07

## 2021-07-15 NOTE — TELEPHONE ENCOUNTER
Per Dr Maguire- okay for pt to reduce prednisone to 5mg daily for one month then stop, pt to resume Humira when he gets shipment on Friday. Pt notified and verbalized understanding, pt is doing okay symptomwise right now but is worried when he runs out of prednisone, Humira won't be working yet to control his symptoms. Pt is set to receive Humira shipment tomorrow and will resume taking this.     Prednisone sent to [facility] in [place] per Dr Maguire

## 2022-01-18 ENCOUNTER — OFFICE VISIT (OUTPATIENT)
Dept: RHEUMATOLOGY | Facility: CLINIC | Age: 42
End: 2022-01-18
Payer: COMMERCIAL

## 2022-01-18 VITALS
SYSTOLIC BLOOD PRESSURE: 110 MMHG | BODY MASS INDEX: 20.79 KG/M2 | DIASTOLIC BLOOD PRESSURE: 66 MMHG | WEIGHT: 145.2 LBS | HEIGHT: 70 IN | HEART RATE: 80 BPM

## 2022-01-18 DIAGNOSIS — M70.21 OLECRANON BURSITIS OF BOTH ELBOWS: ICD-10-CM

## 2022-01-18 DIAGNOSIS — M70.22 OLECRANON BURSITIS OF BOTH ELBOWS: ICD-10-CM

## 2022-01-18 DIAGNOSIS — Z79.899 HIGH RISK MEDICATION USE: ICD-10-CM

## 2022-01-18 DIAGNOSIS — M05.79 SEROPOSITIVE RHEUMATOID ARTHRITIS OF MULTIPLE SITES (H): Primary | ICD-10-CM

## 2022-01-18 DIAGNOSIS — R76.8 CYCLIC CITRULLINATED PEPTIDE (CCP) ANTIBODY POSITIVE: ICD-10-CM

## 2022-01-18 LAB
ALBUMIN SERPL-MCNC: 3.6 G/DL (ref 3.5–5)
ALT SERPL W P-5'-P-CCNC: 17 U/L (ref 0–45)
CREAT SERPL-MCNC: 0.85 MG/DL (ref 0.7–1.3)
ERYTHROCYTE [DISTWIDTH] IN BLOOD BY AUTOMATED COUNT: 12.2 % (ref 10–15)
GFR SERPL CREATININE-BSD FRML MDRD: >90 ML/MIN/1.73M2
HCT VFR BLD AUTO: 42.5 % (ref 40–53)
HGB BLD-MCNC: 14.2 G/DL (ref 13.3–17.7)
MCH RBC QN AUTO: 29.7 PG (ref 26.5–33)
MCHC RBC AUTO-ENTMCNC: 33.4 G/DL (ref 31.5–36.5)
MCV RBC AUTO: 89 FL (ref 78–100)
PLATELET # BLD AUTO: 325 10E3/UL (ref 150–450)
RBC # BLD AUTO: 4.78 10E6/UL (ref 4.4–5.9)
WBC # BLD AUTO: 5.8 10E3/UL (ref 4–11)

## 2022-01-18 PROCEDURE — 82565 ASSAY OF CREATININE: CPT | Performed by: INTERNAL MEDICINE

## 2022-01-18 PROCEDURE — 84460 ALANINE AMINO (ALT) (SGPT): CPT | Performed by: INTERNAL MEDICINE

## 2022-01-18 PROCEDURE — 99214 OFFICE O/P EST MOD 30 MIN: CPT | Performed by: INTERNAL MEDICINE

## 2022-01-18 PROCEDURE — 82040 ASSAY OF SERUM ALBUMIN: CPT | Performed by: INTERNAL MEDICINE

## 2022-01-18 PROCEDURE — 85027 COMPLETE CBC AUTOMATED: CPT | Performed by: INTERNAL MEDICINE

## 2022-01-18 PROCEDURE — 36415 COLL VENOUS BLD VENIPUNCTURE: CPT | Performed by: INTERNAL MEDICINE

## 2022-01-18 RX ORDER — HYDROXYCHLOROQUINE SULFATE 200 MG/1
200 TABLET, FILM COATED ORAL 2 TIMES DAILY
Qty: 60 TABLET | Refills: 3 | Status: SHIPPED | OUTPATIENT
Start: 2022-01-18 | End: 2022-02-17

## 2022-01-18 ASSESSMENT — MIFFLIN-ST. JEOR: SCORE: 1569.87

## 2022-01-18 NOTE — PROGRESS NOTES
Rheumatology follow-up visit note     Robert is a 41 year old male presents today for follow-up.    Robert was seen today for recheck.    Diagnoses and all orders for this visit:    Seropositive rheumatoid arthritis of multiple sites (H)  -     hydroxychloroquine (PLAQUENIL) 200 MG tablet; Take 1 tablet (200 mg) by mouth 2 times daily  -     Albumin level; Standing  -     ALT; Standing  -     CBC with platelets; Standing  -     Creatinine; Standing    Cyclic citrullinated peptide (CCP) antibody positive    High risk medication use  -     Albumin level; Standing  -     ALT; Standing  -     CBC with platelets; Standing  -     Creatinine; Standing    Olecranon bursitis of both elbows        This patient with seropositive rheumatoid arthritis is unable to continue Humira because of insurance related issues, with alcohol intake methotrexate is not an option, back in 2017 when he was on the triple regimen that was discontinued because of ongoing rheumatoid arthritis activity when Humira was initiated which she did very well with.  For now he is unable to go back to [facility] because of financial reasons.  He is going to try hydroxychloroquine and if needed we will add sulfasalazine .    Follow up in 3 months.    HPI    Robertgriselda Horton is a 41 year old male is here for follow-up.  He has rheumatoid arthritis, polyarticular, seropositive, longstanding, he had done well with Humira.  He and his wife , his insurance was through her work.  There has been several weeks that he has not been able to get the Humira as his coverage is not as good and he would have had to pay $5000.  For the past several months he has been not on any medication for RA.  He is complaining of worsening pain.  This is in multiple joints of hands.  Recently he got another large bill for the Humira that he told the distributor that he had not even requested or accepted.  There is a lot of stress going on with regards to the coverage and  "how the pharmacy online, is managing his situation.  He is having to pay significant sums of money for child support for the next 3 years.  He is working with different insurance.  Meanwhile he is on 7.5 mg of prednisone and is virtually asymptomatic.  We discussed the side effects of steroids including ocular metabolic bone related.  He will start Humira as soon as possible we will meet here in 3 months labs prior        DETAILED EXAMINATION  01/18/22  :    Vitals:    01/18/22 1458   BP: 110/66   BP Location: Right arm   Patient Position: Sitting   Cuff Size: Adult Regular   Pulse: 80   Weight: 65.9 kg (145 lb 3.2 oz)   Height: 1.778 m (5' 10\")     Alert oriented. Head including the face is examined for malar rash, heliotropes, scarring, lupus pernio. Eyes examined for redness such as in episcleritis/scleritis, periorbital lesions.   Neck/ Face examined for parotid gland swelling, range of motion of neck.  Left upper and lower and right upper and lower extremities examined for tenderness, swelling, warmth of the appendicular joints, range of motion, edema, rash.  Some of the important findings included: he does not have evidence of synovitis in the palpable joints of the upper extremities with the exception of multiple PIPs MCPs are tender, he has olecranon effusion bilaterally.  No significant deformities of the digits.  No Heberden nodes.  Range of motion of the shoulders  show full abduction.  No JLT effusion or warmth of the knees.  he does not have dactylitis of the digits.     Patient Active Problem List    Diagnosis Date Noted     Effusion of olecranon bursa, right 12/23/2020     Priority: Medium     Popliteal cyst, right 06/28/2017     Priority: Medium     H/O testicular cancer 06/28/2017     Priority: Medium     High risk medication use 05/31/2016     Priority: Medium     Seropositive rheumatoid arthritis of multiple sites (H) 02/23/2016     Priority: Medium     Cyclic citrullinated peptide (CCP) antibody " "positive 02/23/2016     Priority: Medium     No past surgical history on file.   No past medical history on file.  Allergies   Allergen Reactions     Gadolinium-Containing Contrast Media [Gadolinium Derivatives] Unknown     Oxygen Unknown     \"Pure Oxygen\"      Current Outpatient Medications   Medication Sig Dispense Refill     ibuprofen (ADVIL,MOTRIN) 800 MG tablet [IBUPROFEN (ADVIL,MOTRIN) 800 MG TABLET] Take 800 mg by mouth every 6 (six) hours as needed for pain.       adalimumab (HUMIRA) 40 MG/0.8ML pen kit [ADALIMUMAB 40 MG/0.8 ML PNKT] Inject 0.8 mLs (40 mg) subcutaneously every 14 days. (Patient not taking: Reported on 1/18/2022) 2 each 4     predniSONE (DELTASONE) 2.5 MG tablet Take 2 tablets (5 mg) by mouth daily (Patient not taking: Reported on 1/18/2022) 60 tablet 0     family history is not on file.  Social Connections: Not on file          WBC   Date Value Ref Range Status   11/03/2020 4.4 4.0 - 11.0 thou/uL Final     RBC Count   Date Value Ref Range Status   11/03/2020 5.01 4.40 - 6.20 mill/uL Final     Hemoglobin   Date Value Ref Range Status   11/03/2020 15.6 14.0 - 18.0 g/dL Final     Hematocrit   Date Value Ref Range Status   11/03/2020 45.7 40.0 - 54.0 % Final     MCV   Date Value Ref Range Status   11/03/2020 91 80 - 100 fL Final     MCH   Date Value Ref Range Status   11/03/2020 31.1 27.0 - 34.0 pg Final     Platelet Count   Date Value Ref Range Status   11/03/2020 249 140 - 440 thou/uL Final     ALT   Date Value Ref Range Status   11/03/2020 19 0 - 45 U/L Final     Albumin   Date Value Ref Range Status   11/03/2020 3.8 3.5 - 5.0 g/dL Final     Creatinine   Date Value Ref Range Status   11/03/2020 0.90 0.70 - 1.30 mg/dL Final     GFR Estimate   Date Value Ref Range Status   11/03/2020 >60 >60 mL/min/1.73m2 Final     GFR Estimate If Black   Date Value Ref Range Status   11/03/2020 >60 >60 mL/min/1.73m2 Final     Erythrocyte Sedimentation Rate   Date Value Ref Range Status   07/18/2019 30 (H) 0 - " 15 mm/hr Final     CRP   Date Value Ref Range Status   07/18/2019 0.5 0.0 - 0.8 mg/dL Final

## 2022-03-22 DIAGNOSIS — M05.79 SEROPOSITIVE RHEUMATOID ARTHRITIS OF MULTIPLE SITES (H): Primary | ICD-10-CM

## 2022-03-22 RX ORDER — PREDNISONE 2.5 MG/1
7.5 TABLET ORAL DAILY
Qty: 90 TABLET | Refills: 0 | Status: SHIPPED | OUTPATIENT
Start: 2022-03-22 | End: 2022-04-07

## 2022-03-22 RX ORDER — SULFASALAZINE 500 MG/1
TABLET ORAL
Qty: 120 TABLET | Refills: 0 | Status: SHIPPED | OUTPATIENT
Start: 2022-03-22 | End: 2022-04-25

## 2022-03-22 NOTE — TELEPHONE ENCOUNTER
Pt called back, he would like to start sulfasalazine as well. rx sent to pharmacy per Dr Maguire. Lab orders entered in chart. Pt understands labs are needed 4 weeks after starting sulfasalazine.

## 2022-03-22 NOTE — TELEPHONE ENCOUNTER
Per Dr Maguire- pt can take prednisone 7.5mg daily for 4 weeks, also would like pt to start sulfasalazine 500mg BID for one week then increase to 1g BID, pt should have labs checked 4 weeks after starting sulfasalazine.     Left detailed message for pt and for pt to call back if he wants to start sulfasalazine. Prednisone rx sent to pharmacy per Dr Maguire

## 2022-03-22 NOTE — TELEPHONE ENCOUNTER
Pt states he is having a flare up bekah over his body- pain and swelling in hands, shoulders, ankles, knees. Pt is taking HCQ as recommended at last ov but doesn't feel it is controlling his symptoms. Pt would like rx for prednisone to help his symptoms.     Pt also mentioned not being able to get Humira due to cost issues and having large deductible, pt states [facility] pharmacy sent him a bill for $7000, pt last received Humira in December. I will look further into this as pt should be able to qualify for pt assistance program having private insurance.

## 2022-03-22 NOTE — TELEPHONE ENCOUNTER
The Hydroxychloroquine helps a little.  Patient is now having a flare up and would like a Prednisone script sent to [facility].

## 2022-04-07 ENCOUNTER — TELEPHONE (OUTPATIENT)
Dept: RHEUMATOLOGY | Facility: CLINIC | Age: 42
End: 2022-04-07
Payer: COMMERCIAL

## 2022-04-07 DIAGNOSIS — M05.79 SEROPOSITIVE RHEUMATOID ARTHRITIS OF MULTIPLE SITES (H): ICD-10-CM

## 2022-04-07 RX ORDER — PREDNISONE 5 MG/1
TABLET ORAL
Qty: 85 TABLET | Refills: 0 | Status: SHIPPED | OUTPATIENT
Start: 2022-04-07 | End: 2022-05-03

## 2022-04-07 NOTE — TELEPHONE ENCOUNTER
Pt states he is having more joint pain and swelling all over his body, he has a hard time getting dressed, moving around, is not sleeping well due to the pain. Pt is taking prednisone 7.5mg daily, HCQ and sulfasalazine. Pt understanding it can take several weeks for sulfasalazine to start working but feels like he needs something more right now to help with flare up.     I did call [employer] pt assistance [employer] to see if pt is still enrolled in [employer] pt assistance, he is not and new application needs to be completed. The Humira PA is approved through [employer] until 6/2022. I spoke with [facility] pharmacy and they have 3 refills on file and that his copay amount is $250/month without the copay assistance card.     Pt notified of the above info and will complete his portion of the application online, we will support Dr. Maguire's portion. Once we get a determination, we will let the pt know.

## 2022-04-07 NOTE — TELEPHONE ENCOUNTER
[facility] Call Center    Phone Message    May a detailed message be left on voicemail: yes     Reason for Call: Patient has been taking 1000 mg Sulfasalazine BID along with his prednisone 7.5 mg/day, but he doesn't feel like this is working well to control the pain (see 3/22/22 tel enc, pt was recently started on the sulfasalazine). He notices SOME relief a few hours after taking his prednisone each day, but he has not noticed any improvement from the sulfasalazine at all. He feels like he is getting worse, not better.   Would like a call back from [facility] if possible.     Action Taken: Message routed to:  Other: RHEUMATOLOGY SUPPORT POOL    Travel Screening: Not Applicable

## 2022-04-07 NOTE — TELEPHONE ENCOUNTER
Per Dr Maguire- pt can take prednisone 15mg daily for 1 week 12.5mg daily for 1 week 10mg daily for 1 week then back to 7.5mg daily and pt should follow up in clinic in 1-2 months. rx sent to pharmacy    Pt notified and states at this time he wants to hold off scheduling a follow up due to have a $300 bill from his last visit, finances are tight right now and wants to wait until he sees if he will be approved for humira pt assistance. Pt notified that he will need to be seen before June as this will be 6 months from last visit. prednisone rx sent to pharmacy. We will let pt know as soon as we hear back from [employer] pt assistance program

## 2022-04-12 NOTE — TELEPHONE ENCOUNTER
Patient is calling back to give an update after starting prednisone. He feels that nothing is changing.  Patient would like to discuss.

## 2022-04-12 NOTE — TELEPHONE ENCOUNTER
Per Dr Maguier- pt can increase prednisone to 20mg daily for 2 weeks then reduce back to previous taper of 15mg daily for 1 weeks 12.5mg daily for 1 week, etc.     Pt was also approved for Humira pt assistance until 4/12/2023, so he will be going back on Humira once he receives this. Pt notified and will watch for [employer] pt assistance to be calling to schedule delivery.     Pt states the prednisone 15mg works once it kicks in but pt still has a lot pain at night and in the morning for a few hours untl the prednisone starts working. Now that he was approved for Humira he will see how he feels the next couple days and try to stay on 15mg and hopefully will receive Humira and start this to help control his symptoms without having to increase the prednisone to 20mg. Pt will call back with any other issues getting Humira or flare up concerns.

## 2022-04-13 ENCOUNTER — TELEPHONE (OUTPATIENT)
Dept: RHEUMATOLOGY | Facility: CLINIC | Age: 42
End: 2022-04-13
Payer: COMMERCIAL

## 2022-04-18 DIAGNOSIS — M05.79 SEROPOSITIVE RHEUMATOID ARTHRITIS OF MULTIPLE SITES (H): ICD-10-CM

## 2022-04-19 ENCOUNTER — TELEPHONE (OUTPATIENT)
Dept: RHEUMATOLOGY | Facility: CLINIC | Age: 42
End: 2022-04-19
Payer: COMMERCIAL

## 2022-04-19 NOTE — TELEPHONE ENCOUNTER
Pt states he is doing better on prednisone 20mg daily, he will be on this dose for one more week and then reduce as discussed last week. Pt is waiting to hear [facility] to get shipment of Humira, pt will try calling them as well since he has not heard from them. Pt will let us know if he has other other issues getting Humira. Pt is hoping to stop HCQ and SSZ once he gets back on Humira as this was all he was taking in the past and it worked well for him. Pt will call when he needs refill on prednisone.

## 2022-04-22 ENCOUNTER — LAB (OUTPATIENT)
Dept: LAB | Facility: CLINIC | Age: 42
End: 2022-04-22
Payer: COMMERCIAL

## 2022-04-22 DIAGNOSIS — M05.79 SEROPOSITIVE RHEUMATOID ARTHRITIS OF MULTIPLE SITES (H): ICD-10-CM

## 2022-04-22 LAB
ALBUMIN SERPL-MCNC: 3.3 G/DL (ref 3.5–5)
ALT SERPL W P-5'-P-CCNC: 14 U/L (ref 0–45)
CREAT SERPL-MCNC: 0.99 MG/DL (ref 0.7–1.3)
ERYTHROCYTE [DISTWIDTH] IN BLOOD BY AUTOMATED COUNT: 11.8 % (ref 10–15)
GFR SERPL CREATININE-BSD FRML MDRD: >90 ML/MIN/1.73M2
HCT VFR BLD AUTO: 43.8 % (ref 40–53)
HGB BLD-MCNC: 14.6 G/DL (ref 13.3–17.7)
MCH RBC QN AUTO: 29.5 PG (ref 26.5–33)
MCHC RBC AUTO-ENTMCNC: 33.3 G/DL (ref 31.5–36.5)
MCV RBC AUTO: 89 FL (ref 78–100)
PLATELET # BLD AUTO: 386 10E3/UL (ref 150–450)
RBC # BLD AUTO: 4.95 10E6/UL (ref 4.4–5.9)
WBC # BLD AUTO: 7.9 10E3/UL (ref 4–11)

## 2022-04-22 PROCEDURE — 82565 ASSAY OF CREATININE: CPT

## 2022-04-22 PROCEDURE — 84460 ALANINE AMINO (ALT) (SGPT): CPT

## 2022-04-22 PROCEDURE — 36415 COLL VENOUS BLD VENIPUNCTURE: CPT

## 2022-04-22 PROCEDURE — 82040 ASSAY OF SERUM ALBUMIN: CPT

## 2022-04-22 PROCEDURE — 85027 COMPLETE CBC AUTOMATED: CPT

## 2022-04-25 RX ORDER — SULFASALAZINE 500 MG/1
TABLET ORAL
Qty: 240 TABLET | Refills: 0 | Status: SHIPPED | OUTPATIENT
Start: 2022-04-25 | End: 2022-06-23

## 2022-05-03 ENCOUNTER — TELEPHONE (OUTPATIENT)
Dept: RHEUMATOLOGY | Facility: CLINIC | Age: 42
End: 2022-05-03
Payer: COMMERCIAL

## 2022-05-03 DIAGNOSIS — M05.79 SEROPOSITIVE RHEUMATOID ARTHRITIS OF MULTIPLE SITES (H): ICD-10-CM

## 2022-05-03 RX ORDER — PREDNISONE 5 MG/1
TABLET ORAL
Qty: 85 TABLET | Refills: 0 | Status: SHIPPED | OUTPATIENT
Start: 2022-05-03 | End: 2023-04-18

## 2022-05-03 NOTE — TELEPHONE ENCOUNTER
[facility] Call Center    Phone Message    May a detailed message be left on voicemail: yes     Reason for Call: Medication Question or concern regarding medication   Prescription Clarification  Name of Medication: Prednisone    Prescribing Provider: Andrez   Pharmacy: [facility]     What on the order needs clarification? Pt states he only 30mg left and Pt just reduced to 15mg a day a few days ago. Pt is to decrease prednisone dosage 2.5mg per week per instructions from the nurse.  Pt will not have enough medication to last him. Please contact the Pt back.

## 2022-06-23 DIAGNOSIS — M05.79 SEROPOSITIVE RHEUMATOID ARTHRITIS OF MULTIPLE SITES (H): ICD-10-CM

## 2022-06-23 RX ORDER — SULFASALAZINE 500 MG/1
TABLET ORAL
Qty: 120 TABLET | Refills: 0 | Status: SHIPPED | OUTPATIENT
Start: 2022-06-23

## 2022-06-23 NOTE — TELEPHONE ENCOUNTER
Please call pt to schedule lab only appt in mid July and follow up with Dr Maguire soonest available    Lab orders in chart    30 day supply refilled per Rheum RN refill protocol

## 2023-03-10 ENCOUNTER — TELEPHONE (OUTPATIENT)
Dept: RHEUMATOLOGY | Facility: CLINIC | Age: 43
End: 2023-03-10
Payer: COMMERCIAL

## 2023-03-10 DIAGNOSIS — M05.79 SEROPOSITIVE RHEUMATOID ARTHRITIS OF MULTIPLE SITES (H): ICD-10-CM

## 2023-03-10 NOTE — TELEPHONE ENCOUNTER
Spoke to pt, lab appt scheduled 3/13/23, follow-up with Dr. Maguire scheduled 3/16/23.   Pt reports he took his last injection a few days ago and is not due to take another one for almost 2 weeks, so he is ok to wait and get this Rx refilled at his appt on 3/16/23.   However, he is hoping the [employer] paperwork can be started right away? Will be leaving to go out of town on 4/4/23.     Routed to RHEUMATOLOGY SUPPORT POOL to address paperwork and place new lab orders.

## 2023-03-10 NOTE — TELEPHONE ENCOUNTER
Refill request from [facility]    Medication: Humira Pen  Last Refill: 7/12/21  Last OV: 1/18/22  Last lab: 4/22/22  No future appts

## 2023-03-10 NOTE — TELEPHONE ENCOUNTER
Pt needs an appt with labs prior. Please schedule. If the pt schedules, please let the nurses know so we can place new lab orders. Thanks

## 2023-03-13 ENCOUNTER — LAB (OUTPATIENT)
Dept: LAB | Facility: CLINIC | Age: 43
End: 2023-03-13
Payer: COMMERCIAL

## 2023-03-13 DIAGNOSIS — M05.79 SEROPOSITIVE RHEUMATOID ARTHRITIS OF MULTIPLE SITES (H): ICD-10-CM

## 2023-03-13 LAB
ALBUMIN SERPL BCG-MCNC: 4 G/DL (ref 3.5–5.2)
ALT SERPL W P-5'-P-CCNC: 17 U/L (ref 10–50)
CREAT SERPL-MCNC: 0.94 MG/DL (ref 0.67–1.17)
ERYTHROCYTE [DISTWIDTH] IN BLOOD BY AUTOMATED COUNT: 12 % (ref 10–15)
GFR SERPL CREATININE-BSD FRML MDRD: >90 ML/MIN/1.73M2
HCT VFR BLD AUTO: 43.2 % (ref 40–53)
HGB BLD-MCNC: 14.7 G/DL (ref 13.3–17.7)
MCH RBC QN AUTO: 29.2 PG (ref 26.5–33)
MCHC RBC AUTO-ENTMCNC: 34 G/DL (ref 31.5–36.5)
MCV RBC AUTO: 86 FL (ref 78–100)
PLATELET # BLD AUTO: 278 10E3/UL (ref 150–450)
RBC # BLD AUTO: 5.04 10E6/UL (ref 4.4–5.9)
WBC # BLD AUTO: 5 10E3/UL (ref 4–11)

## 2023-03-13 PROCEDURE — 85027 COMPLETE CBC AUTOMATED: CPT

## 2023-03-13 PROCEDURE — 82040 ASSAY OF SERUM ALBUMIN: CPT

## 2023-03-13 PROCEDURE — 82565 ASSAY OF CREATININE: CPT

## 2023-03-13 PROCEDURE — 84460 ALANINE AMINO (ALT) (SGPT): CPT

## 2023-03-13 PROCEDURE — 36415 COLL VENOUS BLD VENIPUNCTURE: CPT

## 2023-03-16 ENCOUNTER — OFFICE VISIT (OUTPATIENT)
Dept: RHEUMATOLOGY | Facility: CLINIC | Age: 43
End: 2023-03-16
Payer: COMMERCIAL

## 2023-03-16 VITALS
WEIGHT: 150 LBS | BODY MASS INDEX: 21.47 KG/M2 | HEIGHT: 70 IN | HEART RATE: 68 BPM | SYSTOLIC BLOOD PRESSURE: 110 MMHG | DIASTOLIC BLOOD PRESSURE: 68 MMHG

## 2023-03-16 DIAGNOSIS — Z79.899 HIGH RISK MEDICATION USE: ICD-10-CM

## 2023-03-16 DIAGNOSIS — M70.22 OLECRANON BURSITIS OF BOTH ELBOWS: ICD-10-CM

## 2023-03-16 DIAGNOSIS — M05.79 SEROPOSITIVE RHEUMATOID ARTHRITIS OF MULTIPLE SITES (H): Primary | ICD-10-CM

## 2023-03-16 DIAGNOSIS — M70.21 OLECRANON BURSITIS OF BOTH ELBOWS: ICD-10-CM

## 2023-03-16 DIAGNOSIS — R76.8 CYCLIC CITRULLINATED PEPTIDE (CCP) ANTIBODY POSITIVE: ICD-10-CM

## 2023-03-16 PROCEDURE — 99214 OFFICE O/P EST MOD 30 MIN: CPT | Performed by: INTERNAL MEDICINE

## 2023-03-16 RX ORDER — SULFASALAZINE 500 MG/1
TABLET, DELAYED RELEASE ORAL
Qty: 120 TABLET | Refills: 3 | Status: SHIPPED | OUTPATIENT
Start: 2023-03-16

## 2023-03-16 NOTE — PROGRESS NOTES
"      Rheumatology follow-up visit note     Robert is a 42 year old male presents today for follow-up.    Robert was seen today for recheck.    Diagnoses and all orders for this visit:    Seropositive rheumatoid arthritis of multiple sites (H)  -     sulfaSALAzine ER (AZULFIDINE EN) 500 MG EC tablet; 500mg BID for 7 days, then increase to 1000mg BID and continue 1000mg BID thereafter.  -     XR Hand Bilateral G/E 3 Views; Future  -     ALT; Standing  -     Albumin level; Standing  -     CBC with platelets; Standing  -     Creatinine; Standing    Cyclic citrullinated peptide (CCP) antibody positive    High risk medication use  -     ALT; Standing  -     Albumin level; Standing  -     CBC with platelets; Standing  -     Creatinine; Standing    Olecranon bursitis of both elbows        This patient with seropositive severe rheumatoid arthritis has residual synovitis, I have asked him to consider adding sulfasalazine back again recent labs are normal.  We will check labs every 4 weeks for the next 3 months.  We will meet here in 3 months.  He is going to do the paperwork to get the Humira from the 's plan.    Follow up in 3 months.    HPI    Robert Horton is a 42 year old male is here for follow-up. He has rheumatoid arthritis, polyarticular, seropositive, longstanding, he had done well with Humira.  At 1 point he was on sulfasalazine.  This was discontinued as he had felt so much better.  He would not want to go for methotrexate as one of his friends mother possibly \"due to methotrexate\".  Plus as he noted he drinks.  After some tumultuous time with his wife and kids they are back together.  He has noted intermittent pain such as in his hands, knees.  30-minute drive or so would cause a pain such as in his knees.  He has been taking ibuprofen on a daily basis.  He feels that there may be some \"soft spots\" on some of the joints such as the MCPs.      DETAILED EXAMINATION  03/16/23  :    Vitals:    " "03/16/23 1111   BP: 110/68   BP Location: Right arm   Patient Position: Sitting   Cuff Size: Adult Regular   Pulse: 68   Weight: 68 kg (150 lb)   Height: 1.778 m (5' 10\")     Alert oriented. Head including the face is examined for malar rash, heliotropes, scarring, lupus pernio. Eyes examined for redness such as in episcleritis/scleritis, periorbital lesions.   Neck/ Face examined for parotid gland swelling, range of motion of neck.  Left upper and lower and right upper and lower extremities examined for tenderness, swelling, warmth of the appendicular joints, range of motion, edema, rash.  Some of the important findings included: He does have scattered synovitis such as MCP #1-3 of the right hand, scattered tenderness of the left hand PIPs.  He does not have dactylitis of digits.  He has fluid in his olecranon bursa worse on the left side, nodule in the right olecranon area.  Patient Active Problem List    Diagnosis Date Noted     Effusion of olecranon bursa, right 12/23/2020     Priority: Medium     Popliteal cyst, right 06/28/2017     Priority: Medium     H/O testicular cancer 06/28/2017     Priority: Medium     High risk medication use 05/31/2016     Priority: Medium     Seropositive rheumatoid arthritis of multiple sites (H) 02/23/2016     Priority: Medium     Cyclic citrullinated peptide (CCP) antibody positive 02/23/2016     Priority: Medium     No past surgical history on file.   No past medical history on file.  Allergies   Allergen Reactions     Gadolinium-Containing Contrast Media [Gadolinium Derivatives] Unknown     Oxygen Unknown     \"Pure Oxygen\"      Current Outpatient Medications   Medication Sig Dispense Refill     Acetaminophen (TYLENOL PO) Take 500 mg by mouth as needed for mild pain or fever       adalimumab (HUMIRA) 40 MG/0.8ML pen kit [ADALIMUMAB 40 MG/0.8 ML PNKT] Inject 0.8 mLs (40 mg) subcutaneously every 14 days. 2 each 4     ibuprofen (ADVIL,MOTRIN) 800 MG tablet [IBUPROFEN (ADVIL,MOTRIN) " 800 MG TABLET] Take 800 mg by mouth every 6 (six) hours as needed for pain.       predniSONE (DELTASONE) 5 MG tablet Take 15mg po daily for 1 week, then 12.5mg daily for 1 week, then 10mg daily for 1 week, then 7.5mg daily (Patient not taking: Reported on 3/16/2023) 85 tablet 0     sulfaSALAzine (AZULFIDINE) 500 MG tablet TAKE 2 TABLETS BY MOUTH TWICE DAILY (Patient not taking: Reported on 3/16/2023) 120 tablet 0     family history is not on file.  Social Connections: Not on file          WBC Count   Date Value Ref Range Status   03/13/2023 5.0 4.0 - 11.0 10e3/uL Final     RBC Count   Date Value Ref Range Status   03/13/2023 5.04 4.40 - 5.90 10e6/uL Final     Hemoglobin   Date Value Ref Range Status   03/13/2023 14.7 13.3 - 17.7 g/dL Final     Hematocrit   Date Value Ref Range Status   03/13/2023 43.2 40.0 - 53.0 % Final     MCV   Date Value Ref Range Status   03/13/2023 86 78 - 100 fL Final     MCH   Date Value Ref Range Status   03/13/2023 29.2 26.5 - 33.0 pg Final     Platelet Count   Date Value Ref Range Status   03/13/2023 278 150 - 450 10e3/uL Final     ALT   Date Value Ref Range Status   03/13/2023 17 10 - 50 U/L Final     Albumin   Date Value Ref Range Status   03/13/2023 4.0 3.5 - 5.2 g/dL Final   04/22/2022 3.3 (L) 3.5 - 5.0 g/dL Final     Creatinine   Date Value Ref Range Status   03/13/2023 0.94 0.67 - 1.17 mg/dL Final     GFR Estimate   Date Value Ref Range Status   03/13/2023 >90 >60 mL/min/1.73m2 Final     Comment:     eGFR calculated using 2021 CKD-EPI equation.   11/03/2020 >60 >60 mL/min/1.73m2 Final     GFR Estimate If Black   Date Value Ref Range Status   11/03/2020 >60 >60 mL/min/1.73m2 Final     Erythrocyte Sedimentation Rate   Date Value Ref Range Status   07/18/2019 30 (H) 0 - 15 mm/hr Final     CRP   Date Value Ref Range Status   07/18/2019 0.5 0.0 - 0.8 mg/dL Final

## 2023-03-17 ENCOUNTER — TELEPHONE (OUTPATIENT)
Dept: RHEUMATOLOGY | Facility: CLINIC | Age: 43
End: 2023-03-17
Payer: COMMERCIAL

## 2023-03-17 DIAGNOSIS — M05.79 SEROPOSITIVE RHEUMATOID ARTHRITIS OF MULTIPLE SITES (H): ICD-10-CM

## 2023-03-17 NOTE — TELEPHONE ENCOUNTER
Free Drug Application Initiated  Medication:   HUMIRA   Sponsor: Gracie Tien    Phone #:   [phone]  Fax #:   [phone]  Additional Information:      Writer has sent the Md forms to the rheum dept fax # to complete    Will follow up with patient      AUGUSTIN Simmons, [facility]  Specialty Pharmacy Clinic Liaison     [facility]    [email or web address]     Phone: [phone]  Fax: [phone]

## 2023-03-23 NOTE — TELEPHONE ENCOUNTER
FREE DRUG--    Writer is unable to LVM for patient as their mailbox is full.      Patient can call liaison to discuss how to get started on free drug       AUGUSTIN Simmons, [facility]  Specialty Pharmacy Clinic Liaison     [facility]    [email or web address]     Phone: [phone]  Fax: [phone]

## 2023-04-05 NOTE — TELEPHONE ENCOUNTER
Free drug updates    I have left several messages now for Robert with no reply.    Current Free drug expires on 04/12/2023-- he will need to renew still  Please have him call liaAUGUSTIN Pradhan, [facility]  Specialty Pharmacy Clinic Liaison     [facility]    [email or web address]     Phone: [phone]  Fax: [phone]

## 2023-04-11 NOTE — TELEPHONE ENCOUNTER
DATE 4/11/2023    Re-sent Application to MD  Please fax to My Tien  Fax [phone]        Patient states that he completed pt portion online       AUGUSTIN Simmons, [facility]  Specialty Pharmacy Clinic Liaison     [facility] Specialty    [email or web address]     Phone: [phone]  Fax: [phone]

## 2023-04-12 NOTE — TELEPHONE ENCOUNTER
Re-sent Application to MD  Please fax to [employer]  Fax [phone]    Please have Andrez fill out and fax to program and liaison     AUGUSTIN Simmons, [facility]  Specialty Pharmacy Clinic Liaison     [facility]    [email or web address]     Phone: [phone]  Fax: [phone]

## 2023-04-18 ENCOUNTER — TELEPHONE (OUTPATIENT)
Dept: RHEUMATOLOGY | Facility: CLINIC | Age: 43
End: 2023-04-18
Payer: COMMERCIAL

## 2023-04-18 DIAGNOSIS — M05.79 SEROPOSITIVE RHEUMATOID ARTHRITIS OF MULTIPLE SITES (H): ICD-10-CM

## 2023-04-18 RX ORDER — PREDNISONE 2.5 MG/1
7.5 TABLET ORAL DAILY
Qty: 30 TABLET | Refills: 0 | Status: SHIPPED | OUTPATIENT
Start: 2023-04-18 | End: 2023-04-28

## 2023-04-18 NOTE — TELEPHONE ENCOUNTER
Per Dr Maguire- pt can take prednisone 7.5mg daily for 10 days to help with the flare up. Humira pen samples set aside for pt at clinic, he will come pick these up today.     We will keep pt updated on Humira PA status and with insurance issues.  Pt notified and verbalized understanding. Prednisone rx sent to [facility] pharmacy in [place].

## 2023-04-18 NOTE — TELEPHONE ENCOUNTER
PA Initiation    Medication: FREE DRUG re-auth Puzl   Insurance Company: [employer] - Phone [phone] Fax [phone]  Pharmacy Filling the Rx:    Filling Pharmacy Phone:    Filling Pharmacy Fax:    Start Date: 4/18/2023    Writer called plan-- the humira is excluded from members plan. Not even eligible for prior authorizations. Member also has a hold on their account with insurance patient will need to call plan.       FREE DRUG is requiring a prior auth in place. I will need to call the humira free drug program      AUGUSTIN Simmons, [facility]  Specialty Pharmacy Clinic Liaison     [facility]    [email or web address]     Phone: [phone]  Fax: [phone]

## 2023-04-18 NOTE — TELEPHONE ENCOUNTER
DATE 4/18/2023    Writer received call back from my abbvie--- sounds like this patient can get the drug with insurance but is restricted to using the [facility] specialty pharmacy   [phone]    Writer has sent RX to be printed to RN who will need to fax to [facility] specialty     Spoke to patient and informed him as well       AUGUSTIN Simmons, [facility]  Specialty Pharmacy Clinic Liaison     [facility]    [email or web address]     Phone: [phone]  Fax: [phone]

## 2023-04-18 NOTE — TELEPHONE ENCOUNTER
Pt states he is having a flare up bekah over his body- pain and swelling in neck, shoulders, wrists, hands, making it hard to work or just get around during the day. Pt states his insurance changed back to [employer], he is still trying to get back on Humira, has been with out Humira for 6-8 weeks therefore is flaring up. Pt still takes sulfasalazine. Pt would like rx for prednisone is possible.     Explained that we are working on PA for insurance and will update him as we get this back from insurance and hopefully can get pt approved for Humira copay card or assistance. In the mean time- offered pt two Humira sample pens that he can  at the [facility] so he can get back on medication until we can get insurance issues figured out. Pt will pick these samples up, pts name on these samples in core fridge.

## 2023-04-18 NOTE — TELEPHONE ENCOUNTER
FREE DRUG/ PA issues      Writer spoke to plan [employer]-- humira is not on the formulary and is excluded for coverage. I have called the my [employer] free drug program to inform them. Tien is having us again call insurance to appeal-- which is not able to happen when the drug is not denied it is just not on the formulary.   His insurance plan, [employer] has a contract with [facility] who will help patients apply for free drug program since they do not cover specialty drugs at all. We have already applied to free drug ( my [employer]) so this is a waste of time. Currently insurance and free drug are holding getting the humira coverage for this patient.       I have spoke to the patient regarding this he is aware we are working on solutions.     Writer is awaiting a call back from my [employer] to see if we can speed this along or if we need to withdraw case  and  Talk to provider about a change therapy    AUGUSTIN Simmons, [facility]  Specialty Pharmacy Clinic Liaison     [facility] Specialty    [email or web address]     Phone: [phone]  Fax: [phone]

## 2023-04-18 NOTE — TELEPHONE ENCOUNTER
Dr. Maguire    Patient call to talk to a nurse with issue on the Humira medication. Also, believe that there may need to be a provider form fill out.

## 2023-04-18 NOTE — TELEPHONE ENCOUNTER
Spoke to [employer], they said Humira is covered on pts plan but he needs to have it filled through a specialty pharmacy.

## 2023-04-27 NOTE — TELEPHONE ENCOUNTER
Pt is calling to follow up on the PA for the Humira, Pt has not heard back regarding an update on the PA?    Also Pt is questioning if he is to take anymore prednisone besides the 10 day course of the 7.5mg?  Pt thought there was usually a taper dose when taking the prednisone?    Please contact the Pt back to let him know.

## 2023-04-27 NOTE — TELEPHONE ENCOUNTER
refaxed Humira rx to [facility] pharmacy fax  [phone]      Pt notifed this was done and to call pharmacy to see if they can run a test claim to see what his copay will be or if insurance will cover Humira. Pt will do so and let us know. Pt believes his Humira copay card is active and will get this info to the pharmacy to run as well.     Pt states he is doing much better since taking prednisone and resuming Humira. Pt will finish current course of prednisone and then stop. He will continue on Humira, let pt know that if we are still not able to get insurance approval for Humira, we can get more samples for him so he does not need to stop therapy until we can get the insurance/copay part figured out.     Pt will call back after talking to [facility] pharmacy regarding Humira copay

## 2023-04-28 NOTE — TELEPHONE ENCOUNTER
Spoke to [facility]     they have the Rx and it is processed. They are waiting on a call back from the patient       AUGUSTIN Simmons, [facility]  Specialty Pharmacy Clinic Liaison     [facility] Specialty    [email or web address]     Phone: [phone]  Fax: [phone]

## 2023-05-03 NOTE — TELEPHONE ENCOUNTER
Spoke to [facility] pharmacy- they were able to mail pt his Humira rx and it was covered by insurance with $0 copay

## 2023-05-03 NOTE — TELEPHONE ENCOUNTER
Spoke to [facility] pharmacy- they were able to mail pt his Humira rx and it was covered by insurance with $0 copay

## 2023-10-13 DIAGNOSIS — M05.79 SEROPOSITIVE RHEUMATOID ARTHRITIS OF MULTIPLE SITES (H): ICD-10-CM
